# Patient Record
Sex: MALE | Race: WHITE | NOT HISPANIC OR LATINO | Employment: FULL TIME | URBAN - METROPOLITAN AREA
[De-identification: names, ages, dates, MRNs, and addresses within clinical notes are randomized per-mention and may not be internally consistent; named-entity substitution may affect disease eponyms.]

---

## 2017-06-06 ENCOUNTER — OFFICE VISIT (OUTPATIENT)
Dept: FAMILY MEDICINE | Facility: CLINIC | Age: 42
End: 2017-06-06
Payer: COMMERCIAL

## 2017-06-06 VITALS
HEIGHT: 72 IN | TEMPERATURE: 98.7 F | BODY MASS INDEX: 30.48 KG/M2 | DIASTOLIC BLOOD PRESSURE: 62 MMHG | SYSTOLIC BLOOD PRESSURE: 128 MMHG | OXYGEN SATURATION: 96 % | WEIGHT: 225 LBS

## 2017-06-06 DIAGNOSIS — J02.9 ALLERGIC PHARYNGITIS: ICD-10-CM

## 2017-06-06 DIAGNOSIS — R07.0 THROAT PAIN: Primary | ICD-10-CM

## 2017-06-06 LAB
DEPRECATED S PYO AG THROAT QL EIA: NORMAL
MICRO REPORT STATUS: NORMAL
SPECIMEN SOURCE: NORMAL

## 2017-06-06 PROCEDURE — 87081 CULTURE SCREEN ONLY: CPT | Performed by: NURSE PRACTITIONER

## 2017-06-06 PROCEDURE — 99213 OFFICE O/P EST LOW 20 MIN: CPT | Performed by: NURSE PRACTITIONER

## 2017-06-06 PROCEDURE — 87880 STREP A ASSAY W/OPTIC: CPT | Performed by: NURSE PRACTITIONER

## 2017-06-06 ASSESSMENT — PAIN SCALES - GENERAL: PAINLEVEL: MILD PAIN (2)

## 2017-06-06 NOTE — PROGRESS NOTES
"  SUBJECTIVE:                                                    Eleazar Herrera is a 42 year old male who presents to clinic today for the following health issues:      Acute Illness   Acute illness concerns: Sore throat  Onset: 3 1/2 weeks    Fever: no    Chills/Sweats: no    Headache (location?): no    Sinus Pressure:no    Conjunctivitis:  no    Ear Pain: no    Rhinorrhea: no    Congestion: no    Sore Throat: YES     Cough: YES-non-productive    Wheeze: no    Decreased Appetite: no    Nausea: no    Vomiting: no    Diarrhea:  no    Dysuria/Freq.: no    Fatigue/Achiness: no    Sick/Strep Exposure: no     Therapies Tried and outcome: Chloroacetic and OTC cold medications    Had cold symptoms which have since resolved but the sore throat has not  Sore throat is better \"because I don't have post nasal drip\" but still present and rates at 1-2/10  Not taking anything for it  Tolerating oral intake  Worse at the end of the day and in the morning  Cough has resolved  Denies acid reflux, dyspepsia, indigestion  Not painful \"just annoying\"  Declines STD testing  Nonsmoker    Problem list and histories reviewed & adjusted, as indicated.  Additional history: none    Patient Active Problem List   Diagnosis     Lumbago     Family history of Parkinson's disease     Hyperlipidemia LDL goal <130     Tremor     Lumbosacral disc herniation     Anxiety     Past Surgical History:   Procedure Laterality Date     DISCECTOMY LUMBAR POSTERIOR MICROSCOPIC ONE LEVEL  5/23/2013    Procedure: DISCECTOMY LUMBAR POSTERIOR MICROSCOPIC ONE LEVEL;  Left Lumbar 5-Sacral 1 Microdiscectomy ;  Surgeon: James Curran MD;  Location: [facility]     epidural injection      back problem     SINUS SURGERY       TONSILLECTOMY         Social History   Substance Use Topics     Smoking status: Never Smoker     Smokeless tobacco: Never Used     Alcohol use Yes      Comment: OCCASSIONALLY     Family History   Problem Relation Age of Onset     " Parkinsonism Mother      Parkinsonism Maternal Grandfather      DIABETES Sister      type i     C.A.D. No family hx of      CANCER No family hx of          Current Outpatient Prescriptions   Medication Sig Dispense Refill     propranolol (INDERAL) 20 MG tablet Take 1 tablet (20 mg) by mouth daily as needed (Patient not taking: Reported on 6/6/2017) 90 tablet 3       Reviewed and updated as needed this visit by clinical staff  Tobacco  Allergies  Meds  Med Hx  Surg Hx  Fam Hx  Soc Hx      Reviewed and updated as needed this visit by Provider         ROS:  Constitutional, HEENT, cardiovascular, pulmonary, gi and gu systems are negative, except as otherwise noted.    OBJECTIVE:                                                    /62  Temp 98.7  F (37.1  C) (Oral)  Ht 6' (1.829 m)  Wt 225 lb (102.1 kg)  SpO2 96%  BMI 30.52 kg/m2  Body mass index is 30.52 kg/(m^2).  GENERAL: healthy, alert and no distress  EYES: Eyes grossly normal to inspection, PERRL and conjunctivae and sclerae normal  HENT: ear canals and TM's normal, oropharynx erythematous but without exudate or lesions. Tonsils surgically absent, nasal mucosa normal without edema, slight clear drainage present  NECK: no adenopathy, no asymmetry, masses, or scars and thyroid normal to palpation  RESP: lungs clear to auscultation - no rales, rhonchi or wheezes  CV: regular rate and rhythm, normal S1 S2, no S3 or S4, no murmur, click or rub, no peripheral edema and peripheral pulses strong    Diagnostic Test Results:  none      ASSESSMENT/PLAN:                                                        ICD-10-CM    1. Throat pain R07.0 Rapid strep screen     Beta strep group A culture   2. Allergic pharyngitis J02.9        No signs of bacterial infection.   Suspect allergy component. Recommend daily antihistamine and BID warm salt water gargle  Also considered GERD vs. STD, though no lesions seen and patient denies concern for STD  Recommend to follow up  in 2 weeks if no improvement with the above measures    МАРИНА Montana CNP  [facility]

## 2017-06-06 NOTE — MR AVS SNAPSHOT
After Visit Summary   6/6/2017    Eleazar Herrera    MRN: [id number]           Patient Information     Date Of Birth          1975        Visit Information        Provider Department      6/6/2017 11:40 AM Amaya Biswas APRN CNP [facility]        Today's Diagnoses     Throat pain    -  1      Care Instructions    Try a warm salt water gargle in the morning and at bedtime  May also try hydrogen peroxide 1:1 with water    Take an daily antihistamine. Look for cetrizine (Zyrtec) or loratadine (Claritin)    If not better in 3 weeks, call the clinic          Follow-ups after your visit        Your next 10 appointments already scheduled     Aug 08, 2017  7:40 AM CDT   (Arrive by 7:25 AM)   Return Movement Disorder with Nikolai rAce MD   [facility] ([facility])    [address] [place] [id number] [phone]              Who to contact     If you have questions or need follow up information about today's clinic visit or your schedule please contact [facility] directly at [phone].  Normal or non-critical lab and imaging results will be communicated to you by MyChart, letter or phone within 4 business days after the clinic has received the results. If you do not hear from us within 7 days, please contact the clinic through [employer] [facility] or phone. If you have a critical or abnormal lab result, we will notify you by phone as soon as possible.  Submit refill requests through [employer] or call your pharmacy and they will forward the refill request to us. Please allow 3 business days for your refill to be completed.          Additional Information About Your Visit        MyChart Information     [employer] gives you secure access to your electronic health record. If you see a primary care provider, you can also send messages to your care team and make appointments. If you have questions, please  call your primary care clinic.  If you do not have a primary care provider, please call [phone] and they will assist you.        Care EveryWhere ID     This is your Care EveryWhere ID. This could be used by other organizations to access your [facility] medical records  [id number]        Your Vitals Were     Temperature Height Pulse Oximetry BMI (Body Mass Index)          98.7  F (37.1  C) (Oral) 6' (1.829 m) 96% 30.52 kg/m2         Blood Pressure from Last 3 Encounters:   06/06/17 146/79   08/03/16 122/78   04/14/16 130/78    Weight from Last 3 Encounters:   06/06/17 225 lb (102.1 kg)   08/03/16 225 lb (102.1 kg)   04/14/16 221 lb 8 oz (100.5 kg)              We Performed the Following     Beta strep group A culture     Rapid strep screen        Primary Care Provider Office Phone # Fax #    Scot Raygoza [phone]       [place] [address] [facility] [address]        Thank you!     Thank you for choosing [facility]  for your care. Our goal is always to provide you with excellent care. Hearing back from our patients is one way we can continue to improve our services. Please take a few minutes to complete the written survey that you may receive in the mail after your visit with us. Thank you!             Your Updated Medication List - Protect others around you: Learn how to safely use, store and throw away your medicines at www.disposemymeds.org.          This list is accurate as of: 6/6/17 12:35 PM.  Always use your most recent med list.                   Brand Name Dispense Instructions for use    propranolol 20 MG tablet    INDERAL    90 tablet    Take 1 tablet (20 mg) by mouth daily as needed

## 2017-06-06 NOTE — NURSING NOTE
Chief Complaint   Patient presents with     Pharyngitis       Initial /79 (BP Location: Right arm, Patient Position: Chair, Cuff Size: Adult Regular)  Temp 98.7  F (37.1  C) (Oral)  Ht 6' (1.829 m)  Wt 225 lb (102.1 kg)  SpO2 96%  BMI 30.52 kg/m2 Estimated body mass index is 30.52 kg/(m^2) as calculated from the following:    Height as of this encounter: 6' (1.829 m).    Weight as of this encounter: 225 lb (102.1 kg).  Medication Reconciliation: complete.  KEREN Griffith MA

## 2017-06-06 NOTE — PATIENT INSTRUCTIONS
Try a warm salt water gargle in the morning and at bedtime  May also try hydrogen peroxide 1:1 with water    Take an daily antihistamine. Look for cetrizine (Zyrtec) or loratadine (Claritin)    If not better in 3 weeks, call the clinic

## 2017-06-07 LAB
BACTERIA SPEC CULT: NORMAL
MICRO REPORT STATUS: NORMAL
SPECIMEN SOURCE: NORMAL

## 2018-04-09 ENCOUNTER — TRANSFERRED RECORDS (OUTPATIENT)
Dept: HEALTH INFORMATION MANAGEMENT | Facility: CLINIC | Age: 43
End: 2018-04-09
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
## 2018-09-21 ENCOUNTER — OFFICE VISIT (OUTPATIENT)
Dept: NEUROLOGY | Facility: CLINIC | Age: 43
End: 2018-09-21
Payer: COMMERCIAL

## 2018-09-21 VITALS
DIASTOLIC BLOOD PRESSURE: 73 MMHG | RESPIRATION RATE: 12 BRPM | HEART RATE: 62 BPM | WEIGHT: 227.8 LBS | OXYGEN SATURATION: 97 % | SYSTOLIC BLOOD PRESSURE: 122 MMHG | BODY MASS INDEX: 30.9 KG/M2

## 2018-09-21 DIAGNOSIS — H91.90 CHANGE IN HEARING, UNSPECIFIED LATERALITY: ICD-10-CM

## 2018-09-21 DIAGNOSIS — G47.9 SLEEP DISORDER: ICD-10-CM

## 2018-09-21 DIAGNOSIS — R25.1 TREMOR: Primary | ICD-10-CM

## 2018-09-21 DIAGNOSIS — R06.83 SNORES: ICD-10-CM

## 2018-09-21 DIAGNOSIS — Z00.00 ROUTINE GENERAL MEDICAL EXAMINATION AT A HEALTH CARE FACILITY: ICD-10-CM

## 2018-09-21 RX ORDER — PROPRANOLOL HYDROCHLORIDE 20 MG/1
20 TABLET ORAL DAILY PRN
Qty: 90 TABLET | Refills: 3 | Status: SHIPPED | OUTPATIENT
Start: 2018-09-21 | End: 2018-09-21

## 2018-09-21 RX ORDER — PROPRANOLOL HYDROCHLORIDE 20 MG/1
20 TABLET ORAL DAILY PRN
Qty: 90 TABLET | Refills: 3 | Status: SHIPPED | OUTPATIENT
Start: 2018-09-21 | End: 2020-12-11
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
## 2018-09-21 NOTE — Clinical Note
9/21/2018       RE: Eleazar Herrera  [address] [address]     Dear Colleague,    Thank you for referring your patient, Eleazar Herrera, to the [facility] NEUROLOGY at [facility]. Please see a copy of my visit note below.    No notes on file    Again, thank you for allowing me to participate in the care of your patient.      Sincerely,    Nikolai Arce MD

## 2018-10-04 ENCOUNTER — PRE VISIT (OUTPATIENT)
Dept: SLEEP MEDICINE | Facility: CLINIC | Age: 43
End: 2018-10-04
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
## 2018-10-05 ENCOUNTER — OFFICE VISIT (OUTPATIENT)
Dept: SLEEP MEDICINE | Facility: CLINIC | Age: 43
End: 2018-10-05
Payer: COMMERCIAL

## 2018-10-05 VITALS
HEART RATE: 76 BPM | DIASTOLIC BLOOD PRESSURE: 76 MMHG | HEIGHT: 72 IN | BODY MASS INDEX: 30.61 KG/M2 | SYSTOLIC BLOOD PRESSURE: 125 MMHG | WEIGHT: 226 LBS | OXYGEN SATURATION: 97 % | RESPIRATION RATE: 16 BRPM

## 2018-10-05 DIAGNOSIS — G47.9 SLEEP DISORDER: Primary | ICD-10-CM

## 2018-10-05 DIAGNOSIS — G47.50 PARASOMNIA: ICD-10-CM

## 2018-10-05 DIAGNOSIS — R06.83 SNORES: ICD-10-CM

## 2018-10-05 DIAGNOSIS — G47.30 OBSERVED SLEEP APNEA: ICD-10-CM

## 2018-10-05 DIAGNOSIS — Z23 NEED FOR PROPHYLACTIC VACCINATION AND INOCULATION AGAINST INFLUENZA: ICD-10-CM

## 2018-10-05 PROCEDURE — 90686 IIV4 VACC NO PRSV 0.5 ML IM: CPT | Performed by: INTERNAL MEDICINE

## 2018-10-05 PROCEDURE — 90471 IMMUNIZATION ADMIN: CPT | Performed by: INTERNAL MEDICINE

## 2018-10-05 PROCEDURE — 99204 OFFICE O/P NEW MOD 45 MIN: CPT | Mod: 25 | Performed by: INTERNAL MEDICINE

## 2018-10-05 NOTE — PROGRESS NOTES

## 2018-10-05 NOTE — PROGRESS NOTES
Chief complaint: Consultation requested by Nikolai Arce MD for the evaluation of unusual behaviors at night as well as snoring    History of Present Illness: 43-year-old gentleman with history of tremor,  followed in the movement disorder clinic.  There is a history of parkinsonism in the family.  In the last couple of years he has had increasing frequency of snoring, vivid bad dreams, kicking and sometimes thrashing his arms.  He is also been vocal at night.  He is gained about 10 pounds in the last 10 years or so.  He wakes up with headaches a couple times a week.  He does grind his teeth and is being evaluated for this he has not had an appliance made at this point.  The snoring is loud and in all body positions and he has been told about possible apneas by his wife.  Does have some dry mouth in the morning and some difficulty breathing through his nose, left nasal passages narrow.  He has had sinus surgery in the past.  He denies excessive sleepiness but does report a slightly elevated Adams.  He can be sleepy on long road trips and has had to stop before.  Otherwise he does do a fair amount of driving. He has 3 cups of coffee and caffeinated soda a day.  No sleepiness behind the wheel otherwise.  He does drink alcohol,  About 3-4 alcoholic beverages a night, usually beer and wine.  He does not use medical or recreational marijuana.  No current tobacco use.    He has had occasional leg cramps but no restless leg symptoms.  Bedroom is conducive to sleep however he does have a toddler coming into the room most nights usually falls asleep quickly in the bed.    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        Social History     Social History     Marital status: Single     Spouse name: N/A     Number of children: 0     Years of education: N/A     Occupational History      [employer]     Social History Main Topics     Smoking status: Never Smoker     Smokeless tobacco: Never Used     Alcohol use Yes      Comment: OCCASSIONALLY     Drug use: No     Sexual activity: Yes     Partners: Female     Other Topics Concern     Parent/Sibling W/ Cabg, Mi Or Angioplasty Before 65f 55m? 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 He is living in [facility]    Single    Has a house mate; who lives upstairs    No dependents.    Broke up with girl friend - after 6 yrs.    She had 2 kids: 12 yrs and 9 yrs    No smoking    Social alcohol.            Mother lives in [place], [place] -- near [place]    She has parkinson and is hallucinating on mirapex    She is not driving and was born in 1940 and is 73 yrs old.            Family history of parkinson in his mother with onset of her symptoms in her late 60s and she is 73 yrs old now.        Maternal grandfather had parkinson as well and passed away in his 80s.        2015    He lives in [place] and he has a fiance who is pregnant at this time with their child.     She is a heads of commerce for [employer]               Family History   Problem Relation Age of Onset     Parkinsonism Mother      Restless Leg Syndrome Father      Parkinsonism Maternal Grandfather      Diabetes Sister      type i     C.A.D. No family hx of      Cancer No family hx of        Review of Systems: Positve for occasional mild orthopnea when he first lies down, some joint pains, some depression and anxiety symptoms, and per HPI, otherwise  comprehensive review of systems is negative.  No constipation, anosmia, difficulty getting out of a chair, or changes in handwriting.    EXAM: Pleasant and alert  /76  Pulse 76  Resp 16  Ht 1.829 m (6')  Wt 102.5 kg (226 lb)  SpO2 97%  BMI 30.65 kg/m2  HEENT: Normocephalic/atraumatic, pupils are equal, reactive to light, no scleral icterus  Oropharynx: Mallampati 2, tonsillar stage 0I  Neck supple no lymphadenopathy, neck circumference 17.5 inches  Chest: Clear to auscultation bilaterally, no rales or wheezes  Cardiac: Regular rate and rhythm, S1-S2 normal  Abdomen: Obese, soft and nontender, bowel sounds present  Extremities: Warm, well perfused, no cyanosis clubbing or edema  Psychiatric: Mood and affect appear normal  Neurologic: No gross focal deficits    ASSESSMENT: 43-year-old gentleman with nightly snoring, witnessed apneas, daytime fatigue likely under estimated by patient, complicated by possible dream enactment with swinging of his arms and vocalization.  Unclear if this is happening in the first half or second half at night.  Patient suspects first half.  Differential diagnosis includes REM sleep behavior disorder as well as pseudo-REM sleep behavior disorder (associated with obstructive sleep apnea or other disorder of arousal.)    PLAN: Recommended in lab polysomnography is as it is crucial to evaluate REM sleep.  Home sleep apnea testing does not evaluate sleep stages.  The study should be done in a split-night protocol.  If he should have severe sleep apnea recommend CPAP titration during the lab study in order to optimize visualization of REM sleep.  If he does have significant sleep apnea patient however would rather proceed with oral appliance therapy.  I think this would be reasonable he should complete this evaluation prior to having a device made for bruxism.  Please see patient instructions for further details of other counseling.  I strongly encouraged the patient to discontinue  use of alcohol at this point.  He should monitor symptoms off alcohol.  Patient is agreeable with this plan.  He declined the offer of a sleep aid for the sleep study.    Tamiko Guevara M.D.  Pulmonary/Critical Care/Sleep Medicine    The above note was dictated using voice recognition software and may include typographical errors. Please contact the author for any clarifications.

## 2018-10-05 NOTE — PATIENT INSTRUCTIONS
"MY TREATMENT INFORMATION FOR SLEEP APNEA-  Eleazar Herrera    DOCTOR : Tamiko Guevara MD      Am I having a sleep study at a sleep center?  Make sure you have an appointment for the study before you leave!    Am I having a home sleep study?  Watch this video:  [email or web address]?[email or web address]  Please verify your insurance coverage with your insurance carrier    Frequently asked questions:  1. What is Obstructive Sleep Apnea (EMMA)? EMMA is the most common type of sleep apnea. Apnea means, \"without breath.\"  Apnea is most often caused by narrowing or collapse of the upper airway as muscles relax during sleep.   Almost everyone has occasional apneas. Most people with sleep apnea have had brief interruptions at night frequently for many years.  The severity of sleep apnea is related to how frequent and severe the events are.   2. What are the consequences of EMMA? Symptoms include: feeling sleepy during the day, snoring loudly, gasping or stopping of breathing, trouble sleeping, and occasionally morning headaches or heartburn at night.  Sleepiness can be serious and even increase the risk of falling asleep while driving. Other health consequences may include development of high blood pressure and other cardiovascular disease in persons who are susceptible. Untreated EMMA  can contribute to heart disease, stroke and diabetes.   3. What are the treatment options? In most situations, sleep apnea is a lifelong disease that must be managed with daily therapy. Medications are not effective for sleep apnea and surgery is generally not considered until other therapies have been tried. Your treatment is your choice . Continuous Positive Airway (CPAP) works right away and is the therapy that is effective in nearly everyone. An oral device to hold your jaw forward is usually the next most reliable option. Other options include postioning devices (to keep you off your back), " weight loss, and surgery including a tongue pacing device. There is more detail about some of these options below.    Important tips for using CPAP and similar devices   Know your equipment:  CPAP is continuous positive airway pressure that prevents obstructive sleep apnea by keeping the throat from collapsing while you are sleeping. In most cases, the device is  smart  and can slowly self-adjusts if your throat collapses and keeps a record every day of how well you are treated-this information is available to you and your care team.  BPAP is bilevel positive airway pressure that keeps your throat open and also assists each breath with a pressure boost to maintain adequate breathing.  Special kinds of BPAP are used in patients who have inadequate breathing from lung or heart disease. In most cases, the device is  smart  and can slowly self-adjusts to assist breathing. Like CPAP, the device keeps a record of how well you are treated.  Your mask is your connection to the device. You get to choose what feels most comfortable and the staff will help to make sure if fits. Here: are some examples of the different masks that are available:       Key points to remember on your journey with sleep apnea:  1. Sleep study.  PAP devices often need to be adjusted during a sleep study to show that they are effective and adjusted right.  2. Good tips to remember: Try wearing just the mask during a quiet time during the day so your body adapts to wearing it. A humidifier is recommended for comfort in most cases to prevent drying of your nose and throat. Allergy medication from your provider may help you if you are having nasal congestion.  3. Getting settled-in. It takes more than one night for most of us to get used to wearing a mask. Try wearing just the mask during a quiet time during the day so your body adapts to wearing it. A humidifier is recommended for comfort in most cases. Our team will work with you carefully on the  first day and will be in contact within 4 days and again at 2 and 4 weeks for advice and remote device adjustments. Your therapy is evaluated by the device each day.   4. Use it every night. The more you are able to sleep naturally for 7-8 hours, the more likely you will have good sleep and to prevent health risks or symptoms from sleep apnea. Even if you use it 4 hours it helps. Occasionally all of us are unable to use a medical therapy, in sleep apnea, it is not dangerous to miss one night.   5. Communicate. Call our skilled team on the number provided on the first day if your visit for problems that make it difficult to wear the device. Over 2 out of 3 patients can learn to wear the device long-term with help from our team. Remember to call our team or your sleep providers if you are unable to wear the device as we may have other solutions for those who cannot adapt to mask CPAP therapy. It is recommended that you sleep your sleep provider within the first 3 months and yearly after that if you are not having problems.   Take care of your equipment. Make sure you clean your mask and tubing using directions every day and that your filter and mask are replaced as recommended or if they are not working.     BESIDES CPAP, WHAT OTHER THERAPIES ARE THERE?    Positioning Device  Positioning devices are generally used when sleep apnea is mild and only occurs on your back.This example shows a pillow that straps around the waist. It may be appropriate for those whose sleep study shows milder sleep apnea that occurs primarily when lying flat on one's back. Preliminary studies have shown benefit but effectiveness at home may need to be verified by a home sleep test. These devices are generally not covered by medical insurance.  Examples of devices that maintain sleeping on the back to prevent snoring and mild sleep apnea.    Belt type body positioner  Http://[employer]/    Electronic  reminder  Http://nightshifttherapy.com/  Http://www.[employer]ah.com.au/    Oral Appliance  What is oral appliance therapy?  An oral appliance device fits on your teeth at night like a retainer used after having braces. The device is made by a specialized dentist and requires several visits over 1-2 months before a manufactured device is made to fit your teeth and is adjusted to prevent your sleep apnea. Once an oral device is working properly, snoring should be improved. A home sleep test may be recommended at that time if to determine whether the sleep apnea is adequately treated.       Some things to remember:  -Oral devices are often, but not always, covered by your medical insurance. Be sure to check with your insurance provider.   -If you are referred for oral therapy, you will be given a list of specialized dentists to consider or you may choose to visit the Web site of the American Academy of Dental Sleep Medicine  -Oral devices are less likely to work if you have severe sleep apnea or are extremely overweight.     More detailed information  An oral appliance is a small acrylic device that fits over the upper and lower teeth  (similar to a retainer or a mouth guard). This device slightly moves jaw forward, which moves the base of the tongue forward, opens the airway, improves breathing for effective treat snoring and obstructive sleep apnea in perhaps 7 out of 10 people .  The best working devices are custom-made by a dental device  after a mold is made of the teeth 1, 2, 3.  When is an oral appliance indicated?  Oral appliance therapy is recommended as a first-line treatment for patients with primary snoring, mild sleep apnea, and for patients with moderate sleep apnea who prefer appliance therapy to use of CPAP4, 5. Severity of sleep apnea is determined by sleep testing and is based on the number of respiratory events per hour of sleep.   How successful is oral appliance therapy?  The success rate  of oral appliance therapy in patients with mild sleep apnea is 75-80% while in patients with moderate sleep apnea it is 50-70%. The chance of success in patients with severe sleep apnea is 40-50%. The research also shows that oral appliances have a beneficial effect on the cardiovascular health of EMMA patients at the same magnitude as CPAP therapy7.  Oral appliances should be a second-line treatment in cases of severe sleep apnea, but if not completely successful then a combination therapy utilizing CPAP plus oral appliance therapy may be effective. Oral appliances tend to be effective in a broad range of patients although studies show that the patients who have the highest success are females, younger patients, those with milder disease, and less severe obesity. 3, 6.   Finding a dentist that practices dental sleep medicine  Specific training is available through the American Academy of Dental Sleep Medicine for dentists interested in working in the field of sleep. To find a dentist who is educated in the field of sleep and the use of oral appliances, near you, visit the Web site of the American Academy of Dental Sleep Medicine.    References  1. Juani et al. Objectively measured vs self-reported compliance during oral appliance therapy for sleep-disordered breathing. Chest 2013; 144(5): 5196-7268.  2. Milagros et al. Objective measurement of compliance during oral appliance therapy for sleep-disordered breathing. Thorax 2013; 68(1): 91-96.  3. Albert et al. Mandibular advancement devices in 620 men and women with EMMA and snoring: tolerability and predictors of treatment success. Chest 2004; 125: 6651-3328.  4. Cory, et al. Oral appliances for snoring and EMMA: a review. Sleep 2006; 29: 244-262.  5. Ahsan et al. Oral appliance treatment for EMMA: an update. J Clin Sleep Med 2014; 10(2): 215-227.  6. Emeka et al. Predictors of OSAH treatment outcome. J Dent Res 2007; 86:  2014-7057.    Surgery:    Surgery for obstructive sleep apnea is considered generally only when other therapies fail to work. Surgery may be discussed with you if you are having a difficult time tolerating CPAP and or when there is an abnormal structure that requires surgical correction.  Nose and throat surgeries often enlarge the airway to prevent collapse.  Most of these surgeries create pain for 1-2 weeks and up to half of the most common surgeries are not effective throughout life.  You should carefully discuss the benefits and drawbacks to surgery with your sleep provider and surgeon to determine if it is the best solution for you.   More information  Surgery for EMMA is directed at areas that are responsible for narrowing or complete obstruction of the airway during sleep.  There are a wide range of procedures available to enlarge and/or stabilize the airway to prevent blockage of breathing in the three major areas where it can occur: the palate, tongue, and nasal regions.  Successful surgical treatment depends on the accurate identification of the factors responsible for obstructive sleep apnea in each person.  A personalized approach is required because there is no single treatment that works well for everyone.  Because of anatomic variation, consultation with an examination by a sleep surgeon is a critical first step in determining what surgical options are best for each patient.  In some cases, examination during sedation may be recommended in order to guide the selection of procedures.  Patients will be counseled about risks and benefits as well as the typical recovery course after surgery. Surgery is typically not a cure for a person s EMMA.  However, surgery will often significantly improve one s EMMA severity (termed  success rate ).  Even in the absence of a cure, surgery will decrease the cardiovascular risk associated with OSA7; improve overall quality of life8 (sleepiness, functionality, sleep  quality, etc).      Palate Procedures:  Patients with EMMA often have narrowing of their airway in the region of their tonsils and uvula.  The goals of palate procedures are to widen the airway in this region as well as to help the tissues resist collapse.  Modern palate procedure techniques focus on tissue conservation and soft tissue rearrangement, rather than tissue removal.  Often the uvula is preserved in this procedure. Residual sleep apnea is common in patient after pharyngoplasty with an average reduction in sleep apnea events of 33%2.      Tongue Procedures:  ExamWhile patients are awake, the muscles that surround the throat are active and keep this region open for breathing. These muscles relax during sleep, allowing the tongue and other structures to collapse and block breathing.  There are several different tongue procedures available.  Selection of a tongue base procedure depends on characteristics seen on physical exam.  Generally, procedures are aimed at removing bulky tissues in this area or preventing the back of the tongue from falling back during sleep.  Success rates for tongue surgery range from 50-62%3.    Hypoglossal Nerve Stimulation:  Hypoglossal nerve stimulation has recently received approval from the [place] Food and Drug Administration for the treatment of obstructive sleep apnea.  This is based on research showing that the system was safe and effective in treating sleep apnea6.  Results showed that the median AHI score decreased 68%, from 29.3 to 9.0. This therapy uses an implant system that senses breathing patterns and delivers mild stimulation to airway muscles, which keeps the airway open during sleep.  The system consists of three fully implanted components: a small generator (similar in size to a pacemaker), a breathing sensor, and a stimulation lead.  Using a small handheld remote, a patient turns the therapy on before bed and off upon awakening.    Candidates for this  device must be greater than 22 years of age, have moderate to severe EMMA (AHI between 20-65), BMI less than 32, have tried CPAP/oral appliance without success, and have appropriate upper airway anatomy (determined by a sleep endoscopy performed by Dr. Wakefield).    Hypoglossal Nerve Stimulation Pathway:    The sleep surgeon s office will work with the patient through the insurance prior-authorization process (including communications and appeals).    Nasal Procedures:  Nasal obstruction can interfere with nasal breathing during the day and night.  Studies have shown that relief of nasal obstruction can improve the ability of some patients to tolerate positive airway pressure therapy for obstructive sleep apnea1.  Treatment options include medications such as nasal saline, topical corticosteroid and antihistamine sprays, and oral medications such as antihistamines or decongestants. Non-surgical treatments can include external nasal dilators for selected patients. If these are not successful by themselves, surgery can improve the nasal airway either alone or in combination with these other options.      Combination Procedures:  Combination of surgical procedures and other treatments may be recommended, particularly if patients have more than one area of narrowing or persistent positional disease.  The success rate of combination surgery ranges from 66-80%2,3.    References  1. Talia ZHOU. The Role of the Nose in Snoring and Obstructive Sleep Apnoea: An Update.  Eur Arch Otorhinolaryngol. 2011; 268: 1365-73.  2.  Chasidy SM; Nelson JA; Trisha JR; Pallanch JF; Lety MB; Chris SG; Марина VILLATOROD. Surgical modifications of the upper airway for obstructive sleep apnea in adults: a systematic review and meta-analysis. SLEEP 2010;33(10):3719-9273. Rosalind CALVIN. Hypopharyngeal surgery in obstructive sleep apnea: an evidence-based medicine review.  Arch Otolaryngol Head Neck Surg. 2006 Feb;132(2):206-13.  3. Sherman DOHERTY, Unruly Keen  LOUANN. The efficacy of anatomically based multilevel surgery for obstructive sleep apnea. Otolaryngol Head Neck Surg. 2003 Oct;129(4):327-35.  4. Rosalind CALVIN, Goldberg A. Hypopharyngeal Surgery in Obstructive Sleep Apnea: An Evidence-Based Medicine Review. Arch Otolaryngol Head Neck Surg. 2006 Feb;132(2):206-13.  5. Paul PJ et al. Upper-Airway Stimulation for Obstructive Sleep Apnea.  N Engl J Med. 2014 Jan 9;370(2):139-49.  6. Houston Y et al. Increased Incidence of Cardiovascular Disease in Middle-aged Men with Obstructive Sleep Apnea. Am J Respir Crit Care Med; 2002 166: 159-165  7. Quinton EM et al. Studying Life Effects and Effectiveness of Palatopharyngoplasty (SLEEP) study: Subjective Outcomes of Isolated Uvulopalatopharyngoplasty. Otolaryngol Head Neck Surg. 2011; 144: 623-631.            Your BMI is Body mass index is 30.65 kg/(m^2).  Weight management is a personal decision.  If you are interested in exploring weight loss strategies, the following discussion covers the approaches that may be successful. Body mass index (BMI) is one way to tell whether you are at a healthy weight, overweight, or obese. It measures your weight in relation to your height.  A BMI of 18.5 to 24.9 is in the healthy range. A person with a BMI of 25 to 29.9 is considered overweight, and someone with a BMI of 30 or greater is considered obese. More than two-thirds of American adults are considered overweight or obese.  Being overweight or obese increases the risk for further weight gain. Excess weight may lead to heart disease and diabetes.  Creating and following plans for healthy eating and physical activity may help you improve your health.  Weight control is part of healthy lifestyle and includes exercise, emotional health, and healthy eating habits. Careful eating habits lifelong are the mainstay of weight control. Though there are significant health benefits from weight loss, long-term weight loss with diet alone may be very  difficult to achieve- studies show long-term success with dietary management in less than 10% of people. Attaining a healthy weight may be especially difficult to achieve in those with severe obesity. In some cases, medications, devices and surgical management might be considered.  What can you do?  If you are overweight or obese and are interested in methods for weight loss, you should discuss this with your provider.     Consider reducing daily calorie intake by 500 calories.     Keep a food journal.     Avoiding skipping meals, consider cutting portions instead.    Diet combined with exercise helps maintain muscle while optimizing fat loss. Strength training is particularly important for building and maintaining muscle mass. Exercise helps reduce stress, increase energy, and improves fitness. Increasing exercise without diet control, however, may not burn enough calories to loose weight.       Start walking three days a week 10-20 minutes at a time    Work towards walking thirty minutes five days a week     Eventually, increase the speed of your walking for 1-2 minutes at time    In addition, we recommend that you review healthy lifestyles and methods for weight loss available through the National Institutes of Health patient information sites:  http://win.niddk.nih.gov/publications/index.htm    And look into health and wellness programs that may be available through your health insurance provider, employer, local community center, or wes club.    Weight management plan: Patient was referred to their PCP to discuss a diet and exercise plan.

## 2018-10-05 NOTE — NURSING NOTE
Chief Complaint   Patient presents with     Consult       Initial /76  Pulse 76  Resp 16  Ht 1.829 m (6')  Wt 102.5 kg (226 lb)  SpO2 97%  BMI 30.65 kg/m2 Estimated body mass index is 30.65 kg/(m^2) as calculated from the following:    Height as of this encounter: 1.829 m (6').    Weight as of this encounter: 102.5 kg (226 lb).    Medication Reconciliation: complete    Neck circumference: 17.5 inches / 44 centimeters.    DME:     [facility] ~[place]

## 2018-10-05 NOTE — MR AVS SNAPSHOT
"              After Visit Summary   10/5/2018    Eleazar Herrera    MRN: [id number]           Patient Information     Date Of Birth          1975        Visit Information        Provider Department      10/5/2018 1:00 PM Tamiko Guevara MD [facility]        Today's Diagnoses     Need for prophylactic vaccination and inoculation against influenza    -  1    Sleep disorder        Snores        Parasomnia        Observed sleep apnea          Care Instructions    MY TREATMENT INFORMATION FOR SLEEP APNEA-  Eleazar Herrera    DOCTOR : Tamiko Guevara MD      Am I having a sleep study at a sleep center?  Make sure you have an appointment for the study before you leave!    Am I having a home sleep study?  Watch this video:  https://www.Caktus.com/watch?v=CteI_[email or web address]&[email or web address]  Please verify your insurance coverage with your insurance carrier    Frequently asked questions:  1. What is Obstructive Sleep Apnea (EMMA)? EMMA is the most common type of sleep apnea. Apnea means, \"without breath.\"  Apnea is most often caused by narrowing or collapse of the upper airway as muscles relax during sleep.   Almost everyone has occasional apneas. Most people with sleep apnea have had brief interruptions at night frequently for many years.  The severity of sleep apnea is related to how frequent and severe the events are.   2. What are the consequences of EMMA? Symptoms include: feeling sleepy during the day, snoring loudly, gasping or stopping of breathing, trouble sleeping, and occasionally morning headaches or heartburn at night.  Sleepiness can be serious and even increase the risk of falling asleep while driving. Other health consequences may include development of high blood pressure and other cardiovascular disease in persons who are susceptible. Untreated EMMA  can contribute to heart disease, stroke and diabetes.   3. What are the treatment options? In most " situations, sleep apnea is a lifelong disease that must be managed with daily therapy. Medications are not effective for sleep apnea and surgery is generally not considered until other therapies have been tried. Your treatment is your choice . Continuous Positive Airway (CPAP) works right away and is the therapy that is effective in nearly everyone. An oral device to hold your jaw forward is usually the next most reliable option. Other options include postioning devices (to keep you off your back), weight loss, and surgery including a tongue pacing device. There is more detail about some of these options below.    Important tips for using CPAP and similar devices   Know your equipment:  CPAP is continuous positive airway pressure that prevents obstructive sleep apnea by keeping the throat from collapsing while you are sleeping. In most cases, the device is  smart  and can slowly self-adjusts if your throat collapses and keeps a record every day of how well you are treated-this information is available to you and your care team.  BPAP is bilevel positive airway pressure that keeps your throat open and also assists each breath with a pressure boost to maintain adequate breathing.  Special kinds of BPAP are used in patients who have inadequate breathing from lung or heart disease. In most cases, the device is  smart  and can slowly self-adjusts to assist breathing. Like CPAP, the device keeps a record of how well you are treated.  Your mask is your connection to the device. You get to choose what feels most comfortable and the staff will help to make sure if fits. Here: are some examples of the different masks that are available:       Key points to remember on your journey with sleep apnea:  1. Sleep study.  PAP devices often need to be adjusted during a sleep study to show that they are effective and adjusted right.  2. Good tips to remember: Try wearing just the mask during a quiet time during the day so your body  adapts to wearing it. A humidifier is recommended for comfort in most cases to prevent drying of your nose and throat. Allergy medication from your provider may help you if you are having nasal congestion.  3. Getting settled-in. It takes more than one night for most of us to get used to wearing a mask. Try wearing just the mask during a quiet time during the day so your body adapts to wearing it. A humidifier is recommended for comfort in most cases. Our team will work with you carefully on the first day and will be in contact within 4 days and again at 2 and 4 weeks for advice and remote device adjustments. Your therapy is evaluated by the device each day.   4. Use it every night. The more you are able to sleep naturally for 7-8 hours, the more likely you will have good sleep and to prevent health risks or symptoms from sleep apnea. Even if you use it 4 hours it helps. Occasionally all of us are unable to use a medical therapy, in sleep apnea, it is not dangerous to miss one night.   5. Communicate. Call our skilled team on the number provided on the first day if your visit for problems that make it difficult to wear the device. Over 2 out of 3 patients can learn to wear the device long-term with help from our team. Remember to call our team or your sleep providers if you are unable to wear the device as we may have other solutions for those who cannot adapt to mask CPAP therapy. It is recommended that you sleep your sleep provider within the first 3 months and yearly after that if you are not having problems.   Take care of your equipment. Make sure you clean your mask and tubing using directions every day and that your filter and mask are replaced as recommended or if they are not working.     BESIDES CPAP, WHAT OTHER THERAPIES ARE THERE?    Positioning Device  Positioning devices are generally used when sleep apnea is mild and only occurs on your back.This example shows a pillow that straps around the waist. It  may be appropriate for those whose sleep study shows milder sleep apnea that occurs primarily when lying flat on one's back. Preliminary studies have shown benefit but effectiveness at home may need to be verified by a home sleep test. These devices are generally not covered by medical insurance.  Examples of devices that maintain sleeping on the back to prevent snoring and mild sleep apnea.    Belt type body positioner  Http://[employer].com/    Electronic reminder  Http://nightshifttherapy.com/  Http://www.[employer].au/    Oral Appliance  What is oral appliance therapy?  An oral appliance device fits on your teeth at night like a retainer used after having braces. The device is made by a specialized dentist and requires several visits over 1-2 months before a manufactured device is made to fit your teeth and is adjusted to prevent your sleep apnea. Once an oral device is working properly, snoring should be improved. A home sleep test may be recommended at that time if to determine whether the sleep apnea is adequately treated.       Some things to remember:  -Oral devices are often, but not always, covered by your medical insurance. Be sure to check with your insurance provider.   -If you are referred for oral therapy, you will be given a list of specialized dentists to consider or you may choose to visit the Web site of the American Academy of Dental Sleep Medicine  -Oral devices are less likely to work if you have severe sleep apnea or are extremely overweight.     More detailed information  An oral appliance is a small acrylic device that fits over the upper and lower teeth  (similar to a retainer or a mouth guard). This device slightly moves jaw forward, which moves the base of the tongue forward, opens the airway, improves breathing for effective treat snoring and obstructive sleep apnea in perhaps 7 out of 10 people .  The best working devices are custom-made by a dental device  after a mold is  made of the teeth 1, 2, 3.  When is an oral appliance indicated?  Oral appliance therapy is recommended as a first-line treatment for patients with primary snoring, mild sleep apnea, and for patients with moderate sleep apnea who prefer appliance therapy to use of CPAP4, 5. Severity of sleep apnea is determined by sleep testing and is based on the number of respiratory events per hour of sleep.   How successful is oral appliance therapy?  The success rate of oral appliance therapy in patients with mild sleep apnea is 75-80% while in patients with moderate sleep apnea it is 50-70%. The chance of success in patients with severe sleep apnea is 40-50%. The research also shows that oral appliances have a beneficial effect on the cardiovascular health of EMMA patients at the same magnitude as CPAP therapy7.  Oral appliances should be a second-line treatment in cases of severe sleep apnea, but if not completely successful then a combination therapy utilizing CPAP plus oral appliance therapy may be effective. Oral appliances tend to be effective in a broad range of patients although studies show that the patients who have the highest success are females, younger patients, those with milder disease, and less severe obesity. 3, 6.   Finding a dentist that practices dental sleep medicine  Specific training is available through the American Academy of Dental Sleep Medicine for dentists interested in working in the field of sleep. To find a dentist who is educated in the field of sleep and the use of oral appliances, near you, visit the Web site of the American Academy of Dental Sleep Medicine.    References  1. Juani, et al. Objectively measured vs self-reported compliance during oral appliance therapy for sleep-disordered breathing. Chest 2013; 144(5): 5809-2053.  2. Milagros et al. Objective measurement of compliance during oral appliance therapy for sleep-disordered breathing. Thorax 2013; 68(1): 91-96.  3. Albert  et al. Mandibular advancement devices in 620 men and women with EMMA and snoring: tolerability and predictors of treatment success. Chest 2004; 125: 7369-9815.  4. Cory et al. Oral appliances for snoring and EMMA: a review. Sleep 2006; 29: 244-262.  5. Ahsan et al. Oral appliance treatment for EMMA: an update. J Clin Sleep Med 2014; 10(2): 215-227.  6. Emeka et al. Predictors of OSAH treatment outcome. J Dent Res 2007; 86: 4881-2815.    Surgery:    Surgery for obstructive sleep apnea is considered generally only when other therapies fail to work. Surgery may be discussed with you if you are having a difficult time tolerating CPAP and or when there is an abnormal structure that requires surgical correction.  Nose and throat surgeries often enlarge the airway to prevent collapse.  Most of these surgeries create pain for 1-2 weeks and up to half of the most common surgeries are not effective throughout life.  You should carefully discuss the benefits and drawbacks to surgery with your sleep provider and surgeon to determine if it is the best solution for you.   More information  Surgery for EMMA is directed at areas that are responsible for narrowing or complete obstruction of the airway during sleep.  There are a wide range of procedures available to enlarge and/or stabilize the airway to prevent blockage of breathing in the three major areas where it can occur: the palate, tongue, and nasal regions.  Successful surgical treatment depends on the accurate identification of the factors responsible for obstructive sleep apnea in each person.  A personalized approach is required because there is no single treatment that works well for everyone.  Because of anatomic variation, consultation with an examination by a sleep surgeon is a critical first step in determining what surgical options are best for each patient.  In some cases, examination during sedation may be recommended in order to guide the selection of  procedures.  Patients will be counseled about risks and benefits as well as the typical recovery course after surgery. Surgery is typically not a cure for a person s EMMA.  However, surgery will often significantly improve one s EMMA severity (termed  success rate ).  Even in the absence of a cure, surgery will decrease the cardiovascular risk associated with OSA7; improve overall quality of life8 (sleepiness, functionality, sleep quality, etc).      Palate Procedures:  Patients with EMMA often have narrowing of their airway in the region of their tonsils and uvula.  The goals of palate procedures are to widen the airway in this region as well as to help the tissues resist collapse.  Modern palate procedure techniques focus on tissue conservation and soft tissue rearrangement, rather than tissue removal.  Often the uvula is preserved in this procedure. Residual sleep apnea is common in patient after pharyngoplasty with an average reduction in sleep apnea events of 33%2.      Tongue Procedures:  ExamWhile patients are awake, the muscles that surround the throat are active and keep this region open for breathing. These muscles relax during sleep, allowing the tongue and other structures to collapse and block breathing.  There are several different tongue procedures available.  Selection of a tongue base procedure depends on characteristics seen on physical exam.  Generally, procedures are aimed at removing bulky tissues in this area or preventing the back of the tongue from falling back during sleep.  Success rates for tongue surgery range from 50-62%3.    Hypoglossal Nerve Stimulation:  Hypoglossal nerve stimulation has recently received approval from the [place] Food and Drug Administration for the treatment of obstructive sleep apnea.  This is based on research showing that the system was safe and effective in treating sleep apnea6.  Results showed that the median AHI score decreased 68%, from 29.3 to 9.0. This  therapy uses an implant system that senses breathing patterns and delivers mild stimulation to airway muscles, which keeps the airway open during sleep.  The system consists of three fully implanted components: a small generator (similar in size to a pacemaker), a breathing sensor, and a stimulation lead.  Using a small handheld remote, a patient turns the therapy on before bed and off upon awakening.    Candidates for this device must be greater than 22 years of age, have moderate to severe EMMA (AHI between 20-65), BMI less than 32, have tried CPAP/oral appliance without success, and have appropriate upper airway anatomy (determined by a sleep endoscopy performed by Dr. Wakefield).    Hypoglossal Nerve Stimulation Pathway:    The sleep surgeon s office will work with the patient through the insurance prior-authorization process (including communications and appeals).    Nasal Procedures:  Nasal obstruction can interfere with nasal breathing during the day and night.  Studies have shown that relief of nasal obstruction can improve the ability of some patients to tolerate positive airway pressure therapy for obstructive sleep apnea1.  Treatment options include medications such as nasal saline, topical corticosteroid and antihistamine sprays, and oral medications such as antihistamines or decongestants. Non-surgical treatments can include external nasal dilators for selected patients. If these are not successful by themselves, surgery can improve the nasal airway either alone or in combination with these other options.      Combination Procedures:  Combination of surgical procedures and other treatments may be recommended, particularly if patients have more than one area of narrowing or persistent positional disease.  The success rate of combination surgery ranges from 66-80%2,3.    References  1. Talia ZHOU. The Role of the Nose in Snoring and Obstructive Sleep Apnoea: An Update.  Eur Arch Otorhinolaryngol. 2011; 268:  1365-73.  2.  Chasidy SM; Nelson JA; Trisha JR; Pallanch JF; Lety MB; Chris SG; Марина ORTEGA. Surgical modifications of the upper airway for obstructive sleep apnea in adults: a systematic review and meta-analysis. SLEEP 2010;33(10):0626-9200. Rosalind CALVIN. Hypopharyngeal surgery in obstructive sleep apnea: an evidence-based medicine review.  Arch Otolaryngol Head Neck Surg. 2006 Feb;132(2):206-13.  3. Sherman VALDIVIAH1, Angelika Y, Unruly LOUANN. The efficacy of anatomically based multilevel surgery for obstructive sleep apnea. Otolaryngol Head Neck Surg. 2003 Oct;129(4):327-35.  4. Kezirian E, Goldberg A. Hypopharyngeal Surgery in Obstructive Sleep Apnea: An Evidence-Based Medicine Review. Arch Otolaryngol Head Neck Surg. 2006 Feb;132(2):206-13.  5. Paul MORROW et al. Upper-Airway Stimulation for Obstructive Sleep Apnea.  N Engl J Med. 2014 Jan 9;370(2):139-49.  6. Houston Y et al. Increased Incidence of Cardiovascular Disease in Middle-aged Men with Obstructive Sleep Apnea. Am J Respir Crit Care Med; 2002 166: 159-165  7. Alcantara EM et al. Studying Life Effects and Effectiveness of Palatopharyngoplasty (SLEEP) study: Subjective Outcomes of Isolated Uvulopalatopharyngoplasty. Otolaryngol Head Neck Surg. 2011; 144: 623-631.            Your BMI is Body mass index is 30.65 kg/(m^2).  Weight management is a personal decision.  If you are interested in exploring weight loss strategies, the following discussion covers the approaches that may be successful. Body mass index (BMI) is one way to tell whether you are at a healthy weight, overweight, or obese. It measures your weight in relation to your height.  A BMI of 18.5 to 24.9 is in the healthy range. A person with a BMI of 25 to 29.9 is considered overweight, and someone with a BMI of 30 or greater is considered obese. More than two-thirds of American adults are considered overweight or obese.  Being overweight or obese increases the risk for further weight gain. Excess weight may lead to  heart disease and diabetes.  Creating and following plans for healthy eating and physical activity may help you improve your health.  Weight control is part of healthy lifestyle and includes exercise, emotional health, and healthy eating habits. Careful eating habits lifelong are the mainstay of weight control. Though there are significant health benefits from weight loss, long-term weight loss with diet alone may be very difficult to achieve- studies show long-term success with dietary management in less than 10% of people. Attaining a healthy weight may be especially difficult to achieve in those with severe obesity. In some cases, medications, devices and surgical management might be considered.  What can you do?  If you are overweight or obese and are interested in methods for weight loss, you should discuss this with your provider.     Consider reducing daily calorie intake by 500 calories.     Keep a food journal.     Avoiding skipping meals, consider cutting portions instead.    Diet combined with exercise helps maintain muscle while optimizing fat loss. Strength training is particularly important for building and maintaining muscle mass. Exercise helps reduce stress, increase energy, and improves fitness. Increasing exercise without diet control, however, may not burn enough calories to loose weight.       Start walking three days a week 10-20 minutes at a time    Work towards walking thirty minutes five days a week     Eventually, increase the speed of your walking for 1-2 minutes at time    In addition, we recommend that you review healthy lifestyles and methods for weight loss available through the National Institutes of Health patient information sites:  http://win.niddk.nih.gov/publications/index.htm    And look into health and wellness programs that may be available through your health insurance provider, employer, local community center, or wes club.    Weight management plan: Patient was referred  to their PCP to discuss a diet and exercise plan.              Follow-ups after your visit        Your next 10 appointments already scheduled     Nov 08, 2018  8:00 PM CST   PSG Split with SLEEP STUDY RM 1   [facility] ([facility])    [address] [place] [id number] [phone]            Nov 28, 2018  2:20 PM CST   Return Sleep Patient with Tamiko Guevara MD   [facility])    [address] [place] [id number] [phone]            Sep 20, 2019  2:45 PM CDT   (Arrive by 2:30 PM)   Return Movement Disorder with Nikolai Arce MD   [facility] ([facility])    [address] [place] [address] [id number] [phone]              Future tests that were ordered for you today     Open Future Orders        Priority Expected Expires Ordered    Comprehensive Sleep Study Routine  4/3/2019 10/5/2018            Who to contact     If you have questions or need follow up information about today's clinic visit or your schedule please contact [facility] directly at [phone].  Normal or non-critical lab and imaging results will be communicated to you by MyChart, letter or phone within 4 business days after the clinic has received the results. If you do not hear from us within 7 days, please contact the clinic through [employer] or phone. If you have a critical or abnormal lab result, we will notify you by phone as soon as possible.  Submit refill requests through [employer] or call your pharmacy and they will forward the refill request to us. Please allow 3 business days for your refill to be completed.          Additional Information About Your Visit        [employer] Information     [employer] gives you secure access to your electronic health record. If you see a primary care provider,  you can also send messages to your care team and make appointments. If you have questions, please call your primary care clinic.  If you do not have a primary care provider, please call [phone] and they will assist you.        Care EveryWhere ID     This is your Care EveryWhere ID. This could be used by other organizations to access your [facility] medical records  [id number]        Your Vitals Were     Pulse Respirations Height Pulse Oximetry BMI (Body Mass Index)       76 16 1.829 m (6') 97% 30.65 kg/m2        Blood Pressure from Last 3 Encounters:   10/05/18 125/76   09/21/18 122/73   06/06/17 128/62    Weight from Last 3 Encounters:   10/05/18 102.5 kg (226 lb)   09/21/18 103.3 kg (227 lb 12.8 oz)   06/06/17 102.1 kg (225 lb)              We Performed the Following     FLU VACCINE, SPLIT VIRUS, IM (QUADRIVALENT) [23458]- >3 YRS     SLEEP EVALUATION & MANAGEMENT REFERRAL - ADULT -[facility] [place] [facility]  [phone] (Age 2 and up)     Vaccine Administration, Initial [[id number]]        Primary Care Provider Office Phone # Fax #    Scot Raygoza [phone]       [address] [id number]        Equal Access to Services     CECILLE POLK : Hadii jana skaggs hadasho Sogustavoali, waaxda luqadaha, qaybta kaalmada adeegyada, nila walden. So [facility] [phone].    ATENCIÓN: Si habla español, tiene a pham disposición servicios gratuitos de asistencia lingüística. Llame al [phone].    We comply with applicable federal civil rights laws and [place] laws. We do not discriminate on the basis of race, color, national origin, age, disability, sex, sexual orientation, or gender identity.            Thank you!     Thank you for choosing [facility]  for your care. Our goal is always to provide you with excellent care. Hearing back from our patients is one way we can continue to improve our services.  Please take a few minutes to complete the written survey that you may receive in the mail after your visit with us. Thank you!             Your Updated Medication List - Protect others around you: Learn how to safely use, store and throw away your medicines at www.disposemymeds.org.          This list is accurate as of 10/5/18  1:49 PM.  Always use your most recent med list.                   Brand Name Dispense Instructions for use Diagnosis    propranolol 20 MG tablet    INDERAL    90 tablet    Take 1 tablet (20 mg) by mouth daily as needed (tremor)    Tremor

## 2018-11-08 ENCOUNTER — THERAPY VISIT (OUTPATIENT)
Dept: SLEEP MEDICINE | Facility: CLINIC | Age: 43
End: 2018-11-08
Payer: COMMERCIAL

## 2018-11-08 DIAGNOSIS — R06.83 SNORES: ICD-10-CM

## 2018-11-08 DIAGNOSIS — G47.50 PARASOMNIA: ICD-10-CM

## 2018-11-08 DIAGNOSIS — G47.30 OBSERVED SLEEP APNEA: ICD-10-CM

## 2018-11-08 PROCEDURE — 95810 POLYSOM 6/> YRS 4/> PARAM: CPT | Performed by: INTERNAL MEDICINE

## 2018-11-08 NOTE — MR AVS SNAPSHOT
After Visit Summary   11/8/2018    Eleazar Herrera    MRN: [id number]           Patient Information     Date Of Birth          1975        Visit Information        Provider Department      11/8/2018 8:00 PM SLEEP STUDY RM 1 [facility]        Today's Diagnoses     Snores        Parasomnia        Observed sleep apnea           Follow-ups after your visit        Your next 10 appointments already scheduled     Nov 28, 2018  2:20 PM CST   Return Sleep Patient with Tamiko Guevara MD   [facility] ([facility])    [address] [place] [id number] [phone]            Sep 20, 2019  2:45 PM CDT   (Arrive by 2:30 PM)   Return Movement Disorder with Nikolai Arce MD   [facility] Neurology ([facility] [place])    [address] [place] [id number] [phone]              Who to contact     If you have questions or need follow up information about today's clinic visit or your schedule please contact [facility] directly at [phone].  Normal or non-critical lab and imaging results will be communicated to you by [employer], letter or phone within 4 business days after the clinic has received the results. If you do not hear from us within 7 days, please contact the clinic through [employer] or phone. If you have a critical or abnormal lab result, we will notify you by phone as soon as possible.  Submit refill requests through [employer] or call your pharmacy and they will forward the refill request to us. Please allow 3 business days for your refill to be completed.          Additional Information About Your Visit        MyChart Information     [employer] gives you secure access to your electronic health record. If you see a primary care provider, you can also send messages to your care team and make appointments. If you have questions,  please call your primary care clinic.  If you do not have a primary care provider, please call [phone] and they will assist you.        Care EveryWhere ID     This is your Care EveryWhere ID. This could be used by other organizations to access your [place] medical records  [id number]         Blood Pressure from Last 3 Encounters:   10/05/18 125/76   09/21/18 122/73   06/06/17 128/62    Weight from Last 3 Encounters:   10/05/18 102.5 kg (226 lb)   09/21/18 103.3 kg (227 lb 12.8 oz)   06/06/17 102.1 kg (225 lb)              We Performed the Following     Comprehensive Sleep Study        Primary Care Provider Office Phone # Fax #    Scot Raygoza [phone]       [address] [facility] [address]        Equal Access to Services     CECILLE POLK : Hadii jana skaggs hadasho Soomaali, waaxda luqadaha, qaybta kaalmada adeegyada, nila parekh haybrijesh alston . So [facility] [phone].    ATENCIÓN: Si habla español, tiene a pham disposición servicios gratuitos de asistencia lingüística. Llame al [phone].    We comply with applicable federal civil rights laws and [place] laws. We do not discriminate on the basis of race, color, national origin, age, disability, sex, sexual orientation, or gender identity.            Thank you!     Thank you for choosing [facility]  for your care. Our goal is always to provide you with excellent care. Hearing back from our patients is one way we can continue to improve our services. Please take a few minutes to complete the written survey that you may receive in the mail after your visit with us. Thank you!             Your Updated Medication List - Protect others around you: Learn how to safely use, store and throw away your medicines at www.disposemymeds.org.          This list is accurate as of 11/8/18 11:59 PM.  Always use your most recent med list.                   Brand Name Dispense Instructions for use Diagnosis     propranolol 20 MG tablet    INDERAL    90 tablet    Take 1 tablet (20 mg) by mouth daily as needed (tremor)    Tremor

## 2018-11-09 NOTE — PROCEDURES
SLEEP STUDY INTERPRETATION  DIAGNOSTIC POLYSOMNOGRAPHY REPORT      Patient: ANIKET PATEL  YOB: 1975  Study Date: 11/8/2018  MRN: [id number]  Referring Provider: Nikolai Arce MD  Ordering Provider: Tamiko Guevara MD    Indications for Polysomnography: The patient is a 43 y old Male who is 6' and weighs 226.0 lbs. His BMI is 30.9, [place] sleepiness scale 11.0 and neck circumference is 44.5 cm. Relevant medical history includes tremor. A diagnostic polysomnogram was performed to evaluate for sleep apnea/parasomnia.    Polysomnogram Data: A full night polysomnogram recorded the standard physiologic parameters including EEG, EOG, EMG, ECG, nasal and oral airflow. Respiratory parameters of chest and abdominal movements were recorded with respiratory inductance plethysmography. Oxygen saturation was recorded by pulse oximetry. Hypopnea scoring rule used: 1B 4%.    Sleep Architecture: Increased arousal index and increased wake after sleep onset.  The total recording time of the polysomnogram was 430.2 minutes. The total sleep time was 368.5 minutes. Sleep latency was normal at 13.2 minutes without the use of a sleep aid. REM latency was 60.0 minutes. Arousal index was increased at 23.1 arousals per hour. Sleep efficiency was normal at 85.7%. Wake after sleep onset was 48.0 minutes. The patient spent 8.5% of total sleep time in Stage N1, 54.7% in Stage N2, 13.0% in Stage N3, and 23.7% in REM. Time in REM supine was 59.0 minutes.    Respiration: Mild supine predominant obstructive sleep apnea.    Events ? The polysomnogram revealed a presence of 29 obstructive, 5 central, and 24 mixed apneas resulting in an apnea index of 9.4 events per hour. There were 15 obstructive hypopneas and 0 central hypopneas resulting in an obstructive hypopnea index of 2.4 and central hypopnea index of 0 events per hour. The combined apnea/hypopnea index was 11.9 events per hour (central apnea/hypopnea index was 0.8 events per  hour). The REM AHI was 6.9 events per hour. The supine AHI was 25.3 events per hour. The RERA index was 7.7 events per hour.  The RDI was 19.5 events per hour.    Snoring - was reported as moderate to loud.    Respiratory rate and pattern - was notable for normal respiratory rate and pattern.    Sustained Sleep Associated Hypoventilation - Transcutaneous carbon dioxide monitoring was not used, however significant hypoventilation was not suggested by oximetry.    Sleep Associated Hypoxemia - (Greater than 5 minutes O2 sat at or below 88%) was not present. Baseline oxygen saturation was 95.5%. Lowest oxygen saturation was 86.0%. Time spent less than or equal to 88% was 0 minutes. Time spent less than or equal to 89% was 0 minutes.    Movement Activity: Frequent periodic limb movements most of which were not associated with arousals.    Periodic Limb Activity - There were 260 PLMs during the entire study. The PLM index was 42.3 movements per hour. The PLM Arousal Index was 2.0 per hour.    REM EMG Activity - Excessive transient/sustained muscle activity was not present.    Nocturnal Behavior - Abnormal sleep related behaviors were not noted.    Bruxism - None apparent.    Cardiac Summary: Normal sinus rhythm and rates with rare non conducted beat. (See image)  The average pulse rate was 67.0 bpm. The minimum pulse rate was 49.9 bpm while the maximum pulse rate was 101.2 bpm.      Ten second window-non conducted p wave       Assessment:     Mild supine predominant obstructive sleep apnea.    Increased arousal index and increased wake after sleep onset.    Frequent periodic limb movements most of which were not associated with arousals.    Normal REM atonia observed.    Normal sinus rhythm and rates with rare non conducted beat. (see image)      Recommendations:    Encourage lateral sleep positions.    If clinically indicated, consider referral to sleep dentistry for mandibular repositioning appliance to treat mild EMMA.      Alternatively, could consider empirically initiated with Auto?titrating PAP therapy with a range of 5 to 15 cmH2O. Recommend clinical follow up with sleep management team.    Follow up with Primary care or cardiology for 12 Lead EKG and further evaluation for AV conduction block.    Advice regarding the risks of drowsy driving.    Suggest optimizing sleep schedule and avoiding sleep deprivation.    Weight management (BMI > 30).    Pharmacologic therapy should be used for management of restless legs syndrome only if present and clinically indicated and not based on the presence of periodic limb movements alone.    Diagnostic Codes:   Obstructive Sleep Apnea G47.33        _____________________________________   Electronically Signed By: Tamiko Guevara MD 11/9/2018

## 2018-11-12 LAB — SLPCOMP: NORMAL

## 2018-11-14 PROBLEM — G47.33 OSA (OBSTRUCTIVE SLEEP APNEA): Status: ACTIVE | Noted: 2018-11-14

## 2018-11-19 ENCOUNTER — ALLIED HEALTH/NURSE VISIT (OUTPATIENT)
Dept: CARDIOLOGY | Facility: CLINIC | Age: 43
End: 2018-11-19
Attending: INTERNAL MEDICINE
Payer: COMMERCIAL

## 2018-11-19 DIAGNOSIS — G47.33 OSA (OBSTRUCTIVE SLEEP APNEA): ICD-10-CM

## 2018-11-19 DIAGNOSIS — I44.2 COMPLETE AV BLOCK (H): ICD-10-CM

## 2018-11-19 PROCEDURE — 93226 XTRNL ECG REC<48 HR SCAN A/R: CPT | Mod: ZF

## 2018-11-19 PROCEDURE — 93227 XTRNL ECG REC<48 HR R&I: CPT | Mod: ZP | Performed by: INTERNAL MEDICINE

## 2018-11-19 PROCEDURE — 93227 XTRNL ECG REC<48 HR R&I: CPT | Performed by: INTERNAL MEDICINE

## 2018-11-19 PROCEDURE — 93225 XTRNL ECG REC<48 HRS REC: CPT | Mod: ZF

## 2018-11-19 NOTE — NURSING NOTE
Per Dr. Tamiko Guevara, patient to have 48 hour Holter monitor placed.  Diagnosis: Complete AV block  Monitor placed: Yes  Patient Instructed: Yes  Patient verbalized understanding: Yes  Holter # 5  Placed by Kimberlee Burns CMA

## 2018-11-19 NOTE — MR AVS SNAPSHOT
After Visit Summary   11/19/2018    Eleazar Herrera    MRN: [id number]           Patient Information     Date Of Birth          1975        Visit Information        Provider Department      11/19/2018 10:00 AM Tech, Uc Cvc Monitor, [facility]        Today's Diagnoses     Complete AV block (H)        EMMA (obstructive sleep apnea)           Follow-ups after your visit        Your next 10 appointments already scheduled     Nov 28, 2018  2:20 PM CST   Return Sleep Patient with Tamiko Guevara MD   [facility] ([facility])    [address] [place] [id number] [phone]            Sep 20, 2019  2:45 PM CDT   (Arrive by 2:30 PM)   Return Movement Disorder with Nikolai Arce MD   [facility] ([facility])    [address] [place] [id number] [phone]              Who to contact     If you have questions or need follow up information about today's clinic visit or your schedule please contact [facility] directly at [phone].  Normal or non-critical lab and imaging results will be communicated to you by [employer], letter or phone within 4 business days after the clinic has received the results. If you do not hear from us within 7 days, please contact the clinic through [employer] or phone. If you have a critical or abnormal lab result, we will notify you by phone as soon as possible.  Submit refill requests through [employer] or call your pharmacy and they will forward the refill request to us. Please allow 3 business days for your refill to be completed.          Additional Information About Your Visit        [employer] Information     [employer] gives you secure access to your electronic health record. If you see a primary care provider, you can also send messages to your care team and make appointments. If you have questions, please call  your primary care clinic.  If you do not have a primary care provider, please call [phone] and they will assist you.        Care EveryWhere ID     This is your Care EveryWhere ID. This could be used by other organizations to access your [place] medical records  [id number]         Blood Pressure from Last 3 Encounters:   10/05/18 125/76   09/21/18 122/73   06/06/17 128/62    Weight from Last 3 Encounters:   10/05/18 102.5 kg (226 lb)   09/21/18 103.3 kg (227 lb 12.8 oz)   06/06/17 102.1 kg (225 lb)              We Performed the Following     Holter Monitor 48 hour - Adult        Primary Care Provider Office Phone # Fax #    Scot Raygoza [phone]       [address] [facility] [address]        Equal Access to Services     [facility]: Hadii jana skaggs hadasho Soconchita, waaxda luqadaha, qaybta kaalmada adejamesyada, nila parekh haybrijesh alston . So [facility] [phone].    ATENCIÓN: Si habla español, tiene a pham disposición servicios gratuitos de asistencia lingüística. Llame al [phone].    We comply with applicable federal civil rights laws and [place] laws. We do not discriminate on the basis of race, color, national origin, age, disability, sex, sexual orientation, or gender identity.            Thank you!     Thank you for choosing [facility]  for your care. Our goal is always to provide you with excellent care. Hearing back from our patients is one way we can continue to improve our services. Please take a few minutes to complete the written survey that you may receive in the mail after your visit with us. Thank you!             Your Updated Medication List - Protect others around you: Learn how to safely use, store and throw away your medicines at www.disposemymeds.org.          This list is accurate as of 11/19/18  5:53 PM.  Always use your most recent med list.                   Brand Name Dispense Instructions for use Diagnosis    propranolol 20  MG tablet    INDERAL    90 tablet    Take 1 tablet (20 mg) by mouth daily as needed (tremor)    Tremor

## 2018-11-28 ENCOUNTER — OFFICE VISIT (OUTPATIENT)
Dept: SLEEP MEDICINE | Facility: CLINIC | Age: 43
End: 2018-11-28
Payer: COMMERCIAL

## 2018-11-28 VITALS
WEIGHT: 225 LBS | BODY MASS INDEX: 30.48 KG/M2 | HEART RATE: 62 BPM | RESPIRATION RATE: 18 BRPM | SYSTOLIC BLOOD PRESSURE: 121 MMHG | OXYGEN SATURATION: 97 % | HEIGHT: 72 IN | DIASTOLIC BLOOD PRESSURE: 77 MMHG

## 2018-11-28 DIAGNOSIS — G47.33 OSA (OBSTRUCTIVE SLEEP APNEA): Primary | ICD-10-CM

## 2018-11-28 DIAGNOSIS — G47.50 PARASOMNIA: ICD-10-CM

## 2018-11-28 LAB — INTERPRETATION MONITOR -MUSE: NORMAL

## 2018-11-28 PROCEDURE — 99214 OFFICE O/P EST MOD 30 MIN: CPT | Performed by: INTERNAL MEDICINE

## 2018-11-28 NOTE — PROGRESS NOTES
Chief complaint: 43-year-old gentleman here for follow-up of sleep study    History of Present Illness: 43-year-old gentleman with history of tremor, snoring, fatigue.  He underwent polysomnography and is here today to review results.  He also has a history of sleep talking and some kicking.  He denies symptoms of restless legs.  The sleep study results were reviewed in detail with him he has a copy of the results and they are summarized as follows:  Study date 11/8/2018   Overall AHI 11.9, supine AHI 25.    No significant dream enactment behaviors identified.    Normal REM atonia intact.    Heart rates were normal.  Rare nonconducted P wave was noted.  Snoring was noted.    Holter monitor results also reviewed with patient.    [place] Seepiness Scale:6 (Less than 10 normal)    Past Medical History:   Diagnosis Date     Change in hearing 9/21/2018     Family history of Parkinson's disease 11/1/2011     Lumbago 11/1/2011     Snores 9/21/2018     Tremor      Tremor 10/31/2012       No Known Allergies    Current Outpatient Prescriptions   Medication     propranolol (INDERAL) 20 MG tablet     No current facility-administered medications for this visit.        Social History     Social History     Marital status: Single     Spouse name: N/A     Number of children: 0     Years of education: N/A     Occupational History      [employer]     Social History Main Topics     Smoking status: Never Smoker     Smokeless tobacco: Never Used     Alcohol use Yes      Comment: OCCASSIONALLY     Drug use: No     Sexual activity: Yes     Partners: Female     Other Topics Concern     Parent/Sibling W/ Cabg, Mi Or Angioplasty Before 65f 55m? 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 He is living in [facility]    Single    Has a house mate; who lives upstairs    No dependents.    Broke up  with girl friend - after 6 yrs.    She had 2 kids: 12 yrs and 9 yrs    No smoking    Social alcohol.            Mother lives in [place], [place] -- near [place]    She has parkinson and is hallucinating on mirapex    She is not driving and was born in 1939 and is 73 yrs old.            Family history of parkinson in his mother with onset of her symptoms in her late 60s and she is 73 yrs old now.        Maternal grandfather had parkinson as well and passed away in his 80s.        2015    He lives in [place] and he has a fiance who is pregnant at this time with their child.     She is a heads of sceniose for [employer]               Family History   Problem Relation Age of Onset     Parkinsonism Mother      Restless Leg Syndrome Father      Parkinsonism Maternal Grandfather      Diabetes Sister      type i     C.A.D. No family hx of      Cancer No family hx of          EXAM: Pleasant, alert, no distress  /77  Pulse 62  Resp 18  Ht 1.829 m (6')  Wt 102.1 kg (225 lb)  SpO2 97%  BMI 30.52 kg/m2  Psychiatric: Mood and affect appear normal    PSG date:11/8/2018  Wt:226 BMI 30.9  AHI: 11.9  Supine AHI 25  Normal REM atonia intact    Holter Monitor reviewed: rare non conducted p waves likely vagally mediated.    ASSESSMENT: 43-year-old gentleman with overall mild but supine predominant obstructive sleep apnea.  No evidence to support REM sleep behavior disorder as etiology of parasomnia.  Its possible that arousals that her respiratory related may be provoking some parasomnia.  Patient has fatigue but does not have excessive drowsiness.  He does not have significant cardiovascular risk factors.    PLAN: We discussed treatment options including oral appliances and CPAP.  Plan to proceed with oral appliance therapy.  Reassess clinically in about 4 months.  Patient is encouraged to sleep on sides and avoid weight gain.  He found the Holter results are reassuring.  Please see patient instructions for further  details of counseling provided.  A follow-up in 4 months.    Twenty-five minutes spent with patient, >50% spent counseling and coordinating care.      Tamiko Guevara M.D.  Pulmonary/Critical Care/Sleep Medicine    The above note was dictated using voice recognition software and may include typographical errors. Please contact the author for any clarifications.

## 2018-11-28 NOTE — PATIENT INSTRUCTIONS
Sleep on sides  Dental appliance resources recommended by [facility]        [facility]   Sleep Medicine [facility]   Yeison Ranegl DDS, MS     [facility]  [address] [place] [address] [address]  [place] [address]   Appointments: [phone] Ext: [phone]  Fax: [phone]   [email or web address]   Dental and Oral Surgery Clinic   Reddy Suazo DDS   [address] [facility] [address]   [place] [address]   Appointments: [phone]   [email or web address]   [email or web address]       Snoring and Sleep Apnea   Dental Treatment Center   Samuel Smith DDS   [address]   [address]   [place] [address]   Appointments: [phone]   Fax: [phone]   [email or web address]       [facility]  Gene Dorantes DDS- takes medicare  [address], [place] [address]  [address], [place] [address]  [phone]; [phone] (fax)  [employer]    [facility]  Arnaldo Dejesus DDS  [facility]  [address]  [phone]  [place] [address]  [facility]   [facility]   Reddy Suazo DDS   [facility]   [address],   [address]   [place] [address]   Appointments: [phone]   Fax: [phone]     [place] Office   Daniel Baron DDS, MS   [[facility] Medicare]  [facility]   [address]   [address]   [place] [address]   Appointments: [phone]   Fax: [phone]   Dr. Baron accepts Medicaid patients.     [place] Office  Yisel Valdez BDS, MS  [address]  [address]  [place] [address]  Appointments: [phone]  Fax: [phone]    [facility]  Cuate Santhosh DDS  [DME Medicare]  [address]  [address]  [place] [address]  Appointments [phone]  Fax: [phone]    +[facility]   [email or web address]     Malini Painting DDS, PhD, MS   [place] Office   [facility]   [address],   [address]   [place] [address]   Appointments: [phone]   Fax: [phone]     [facility] [place]   [address]   [address]   [place] [address]   Appointments: [phone]   Fax: [phone]     [facility]  Nany Rob DDS  [facility] [address] [address] [phone] (Office)   [phone] (Fax    If you wish to choose your own dental sleep dentist, you may identify a provider close to you: [email or web address]

## 2019-02-05 ENCOUNTER — OFFICE VISIT (OUTPATIENT)
Dept: SURGERY | Facility: CLINIC | Age: 44
End: 2019-02-05
Payer: COMMERCIAL

## 2019-02-05 VITALS
BODY MASS INDEX: 31.29 KG/M2 | WEIGHT: 231 LBS | SYSTOLIC BLOOD PRESSURE: 139 MMHG | HEART RATE: 65 BPM | DIASTOLIC BLOOD PRESSURE: 74 MMHG | HEIGHT: 72 IN

## 2019-02-05 DIAGNOSIS — M54.42 CHRONIC RIGHT-SIDED LOW BACK PAIN WITH LEFT-SIDED SCIATICA: ICD-10-CM

## 2019-02-05 DIAGNOSIS — G89.29 CHRONIC RIGHT-SIDED LOW BACK PAIN WITH LEFT-SIDED SCIATICA: ICD-10-CM

## 2019-02-05 DIAGNOSIS — D17.1 BENIGN LIPOMATOUS NEOPLASM OF SKIN AND SUBCUTANEOUS TISSUE OF TRUNK: Primary | ICD-10-CM

## 2019-02-05 PROCEDURE — 99204 OFFICE O/P NEW MOD 45 MIN: CPT | Performed by: SURGERY

## 2019-02-05 ASSESSMENT — MIFFLIN-ST. JEOR: SCORE: 1980.81

## 2019-02-05 NOTE — PROGRESS NOTES
Dear Scot Urena  I was asked to see this patient by Scot Raygoza for please see below.  I have seen Eleazar Herrera and as you know his chief complaint is lipomas causing back pain.    Noticed about 10 years ago and getting bigger. Has one under left shoulder blade.   Some on the front and side.     HPI:  Patient is a 44 year old male  with complaints see above  The patient noticed the symptoms about 10 years ago.    Patient has family history of this  Problems mother  nothing makes the episode better.      Review Of Systems  Respiratory: No shortness of breath, dyspnea on exertion, cough, or hemoptysis  Cardiovascular: negative  Gastrointestinal: negative  Endocrine: negative  :  negative  /74   Pulse 65   Ht 1.829 m (6')   Wt 104.8 kg (231 lb)   BMI 31.33 kg/m      Past Medical History:   Diagnosis Date     Change in hearing 9/21/2018     Family history of Parkinson's disease 11/1/2011     Lumbago 11/1/2011     Snores 9/21/2018     Tremor      Tremor 10/31/2012       Past Surgical History:   Procedure Laterality Date     DISCECTOMY LUMBAR POSTERIOR MICROSCOPIC ONE LEVEL  5/23/2013    Procedure: DISCECTOMY LUMBAR POSTERIOR MICROSCOPIC ONE LEVEL;  Left Lumbar 5-Sacral 1 Microdiscectomy ;  Surgeon: James Curran MD;  [facility]     epidural injection      back problem     SINUS SURGERY       TONSILLECTOMY         Social History     Socioeconomic History     Marital status: Single     Spouse name: Not on file     Number of children: 0     Years of education: Not on file     Highest education level: Not on file   Social Needs     Financial resource strain: Not on file     Food insecurity - worry: Not on file     Food insecurity - inability: Not on file     Transportation needs - medical: Not on file     Transportation needs - non-medical: Not on file   Occupational History     Employer: [employer]   Tobacco Use     Smoking status: Never Smoker      Smokeless tobacco: Never Used   Substance and Sexual Activity     Alcohol use: Yes     Comment: OCCASSIONALLY     Drug use: No     Sexual activity: Yes     Partners: Female   Other Topics Concern     Parent/sibling w/ CABG, MI or angioplasty before 65F 55M? 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 He is living in [facility]    Single    Has a house mate; who lives upstairs    No dependents.    Broke up with girl friend - after 6 yrs.    She had 2 kids: 12 yrs and 9 yrs    No smoking    Social alcohol.            Mother lives in [place], [place] -- near [place]    She has parkinson and is hallucinating on mirapex    She is not driving and was born in 1939 and is 73 yrs old.            Family history of parkinson in his mother with onset of her symptoms in her late 60s and she is 73 yrs old now.        Maternal grandfather had parkinson as well and passed away in his 80s.        2015    He lives in [place] and he has a fiance who is pregnant at this time with their child.     She is a heads of commerce for [employer]           Current Outpatient Medications   Medication Sig Dispense Refill     propranolol (INDERAL) 20 MG tablet Take 1 tablet (20 mg) by mouth daily as needed (tremor) (Patient not taking: Reported on 10/5/2018) 90 tablet 3       10 Point review of systems all others are negative.   Above was reviewed  Physical exam: /74   Pulse 65   Ht 1.829 m (6')   Wt 104.8 kg (231 lb)   BMI 31.33 kg/m     Patient able to get up on table without difficulty.   Patient is alert and orientated.   Head eyes, nose and mouth within normal limits.  No supraclavicular or cervical adenopathy palpated.  Thyroid within normal limits.  No carotid bruits auscultated.  Lungs are clear to auscultation  Heart is regular rate and rhythm with no murmur or thrills noted.  No costal vertebral  angle tenderness noted.  Abdomen is abdomen is soft without significant tenderness, masses, organomegaly or guarding  bowel sounds are positive and no caput medusa noted.  No obvious hernias noted.      Skin was warm and pink  Normal Affect      Lower extremity edema is not present.      Assessment: on his left scapula it is obviously sticking out more than the other side.  Think that I can feel at least displaced tissue on the lateral border of the scapula.  But not sure that can feel an obvious lipoma.  This will need an MRI to make that that is all it is.    Has multiple lipomas on the abdomen and the the back none below the waist.   Plan to do will get MRI of the chest to look at the mass behind the scapula.  And if a lipoma would remove in the OR with  General anesthesia  And can remove other lipomas on the back that I feel.  And other areas that can be reached depending on what position he needs to be placed to remove the one in the left scapula. .    Risks of surgery include damage to nerves, bleeding, infection, damage to  Vessels, recurrence.   Please follow up with me to go over these results.   Also has increasing pain in the lumbar area and has had surgery on this and has trouble walking lately, so will also get the lumbar MRI.     Time spent with the patient with greater that 50% of the time in discussion was 30 minutes.  In discussing the options .      Ari Armando MD

## 2019-02-05 NOTE — LETTER
2/5/2019         RE: Eleazar Herrera  [address] [id number]        Dear Colleague,    Thank you for referring your patient, Eleazar Herrera, to the [facility]. Please see a copy of my visit note below.    Dear Scot Urena  I was asked to see this patient by Scot Raygoza for please see below.  I have seen Eleazar Herrera and as you know his chief complaint is lipomas causing back pain.    Noticed about 10 years ago and getting bigger. Has one under left shoulder blade.   Some on the front and side.     HPI:  Patient is a 44 year old male  with complaints see above  The patient noticed the symptoms about 10 years ago.    Patient has family history of this  Problems mother  nothing makes the episode better.      Review Of Systems  Respiratory: No shortness of breath, dyspnea on exertion, cough, or hemoptysis  Cardiovascular: negative  Gastrointestinal: negative  Endocrine: negative  :  negative  /74   Pulse 65   Ht 1.829 m (6')   Wt 104.8 kg (231 lb)   BMI 31.33 kg/m       Past Medical History:   Diagnosis Date     Change in hearing 9/21/2018     Family history of Parkinson's disease 11/1/2011     Lumbago 11/1/2011     Snores 9/21/2018     Tremor      Tremor 10/31/2012       Past Surgical History:   Procedure Laterality Date     DISCECTOMY LUMBAR POSTERIOR MICROSCOPIC ONE LEVEL  5/23/2013    Procedure: DISCECTOMY LUMBAR POSTERIOR MICROSCOPIC ONE LEVEL;  Left Lumbar 5-Sacral 1 Microdiscectomy ;  Surgeon: James Curran MD;  [facility]     epidural injection      back problem     SINUS SURGERY       TONSILLECTOMY         Social History     Socioeconomic History     Marital status: Single     Spouse name: Not on file     Number of children: 0     Years of education: Not on file     Highest education level: Not on file   Social Needs     Financial resource strain: Not on file     Food insecurity - worry: Not on file     Food  insecurity - inability: Not on file     Transportation needs - medical: Not on file     Transportation needs - non-medical: Not on file   Occupational History     Employer: [employer]   Tobacco Use     Smoking status: Never Smoker     Smokeless tobacco: Never Used   Substance and Sexual Activity     Alcohol use: Yes     Comment: OCCASSIONALLY     Drug use: No     Sexual activity: Yes     Partners: Female   Other Topics Concern     Parent/sibling w/ CABG, MI or angioplasty before 65F 55M? No   Social History Narrative    : [employer] -- working 10-14 hour days    Lives in a foreclosed house in 2008 and is remodeling it. He is living in [facility]    Single    Has a house mate; who lives upstairs    No dependents.    Broke up with girl friend - after 6 yrs.    She had 2 kids: 12 yrs and 9 yrs    No smoking    Social alcohol.            Mother lives in [place], [place] -- near [place]    She has parkinson and is hallucinating on mirapex    She is not driving and was born in 1939 and is 73 yrs old.            Family history of parkinson in his mother with onset of her symptoms in her late 60s and she is 73 yrs old now.        Maternal grandfather had parkinson as well and passed away in his 80s.        2015    He lives in [place] and he has a fiance who is pregnant at this time with their child.     She is a heads of commerce for [employer]           Current Outpatient Medications   Medication Sig Dispense Refill     propranolol (INDERAL) 20 MG tablet Take 1 tablet (20 mg) by mouth daily as needed (tremor) (Patient not taking: Reported on 10/5/2018) 90 tablet 3       10 Point review of systems all others are negative.   Above was reviewed  Physical exam: /74   Pulse 65   Ht 1.829 m (6')   Wt 104.8 kg (231 lb)   BMI 31.33 kg/m      Patient able to get up on table without difficulty.   Patient is alert and orientated.   Head eyes, nose and mouth  within normal limits.  No supraclavicular or cervical adenopathy palpated.  Thyroid within normal limits.  No carotid bruits auscultated.  Lungs are clear to auscultation  Heart is regular rate and rhythm with no murmur or thrills noted.  No costal vertebral angle tenderness noted.  Abdomen is abdomen is soft without significant tenderness, masses, organomegaly or guarding  bowel sounds are positive and no caput medusa noted.  No obvious hernias noted.      Skin was warm and pink  Normal Affect      Lower extremity edema is not present.      Assessment: on his left scapula it is obviously sticking out more than the other side.  Think that I can feel at least displaced tissue on the lateral border of the scapula.  But not sure that can feel an obvious lipoma.  This will need an MRI to make that that is all it is.    Has multiple lipomas on the abdomen and the the back none below the waist.   Plan to do will get MRI of the chest to look at the mass behind the scapula.  And if a lipoma would remove in the OR with  General anesthesia  And can remove other lipomas on the back that I feel.  And other areas that can be reached depending on what position he needs to be placed to remove the one in the left scapula. .    Risks of surgery include damage to nerves, bleeding, infection, damage to  Vessels, recurrence.   Please follow up with me to go over these results.   Also has increasing pain in the lumbar area and has had surgery on this and has trouble walking lately, so will also get the lumbar MRI.     Time spent with the patient with greater that 50% of the time in discussion was 30 minutes.  In discussing the options .      Ari Armando MD      Again, thank you for allowing me to participate in the care of your patient.        Sincerely,        Ari Armando MD

## 2019-02-05 NOTE — PATIENT INSTRUCTIONS
Assessment: on his left scapula it is obviously sticking out more than the other side.  Think that I can feel at least displaced tissue on the lateral border of the scapula.  But not sure that can feel an obvious lipoma.  This will need an MRI to make that that is all it is.    Has multiple lipomas on the abdomen and the the back none below the waist.   Plan to do will get MRI of the chest to look at the mass behind the scapula.  And if a lipoma would remove in the OR with  General anesthesia  And can remove other lipomas on the back that I feel.  And other areas that can be reached depending on what position he needs to be placed to remove the one in the left scapula. .  Also has increasing pain in the lumbar area and has had surgery on this and has trouble walking lately, so will also get the lumbar MRI.     Risks of surgery include damage to nerves, bleeding, infection, damage to  Vessels, recurrence.   Please follow up with me to go over these results.

## 2019-02-06 ENCOUNTER — ANCILLARY PROCEDURE (OUTPATIENT)
Dept: MRI IMAGING | Facility: CLINIC | Age: 44
End: 2019-02-06
Attending: SURGERY
Payer: COMMERCIAL

## 2019-02-06 DIAGNOSIS — M54.42 CHRONIC RIGHT-SIDED LOW BACK PAIN WITH LEFT-SIDED SCIATICA: ICD-10-CM

## 2019-02-06 DIAGNOSIS — D17.1 BENIGN LIPOMATOUS NEOPLASM OF SKIN AND SUBCUTANEOUS TISSUE OF TRUNK: ICD-10-CM

## 2019-02-06 DIAGNOSIS — G89.29 CHRONIC RIGHT-SIDED LOW BACK PAIN WITH LEFT-SIDED SCIATICA: ICD-10-CM

## 2019-02-06 PROCEDURE — 72158 MRI LUMBAR SPINE W/O & W/DYE: CPT | Mod: TC

## 2019-02-06 PROCEDURE — A9585 GADOBUTROL INJECTION: HCPCS

## 2019-02-06 PROCEDURE — 73225 MR ANGIO UPR EXTR W/O&W/DYE: CPT | Mod: TC

## 2019-02-06 RX ORDER — GADOBUTROL 604.72 MG/ML
10 INJECTION INTRAVENOUS ONCE
Status: COMPLETED | OUTPATIENT
Start: 2019-02-06 | End: 2019-02-06

## 2019-02-06 RX ADMIN — GADOBUTROL 10 ML: 604.72 INJECTION INTRAVENOUS at 19:50

## 2019-02-11 ENCOUNTER — OFFICE VISIT (OUTPATIENT)
Dept: ORTHOPEDICS | Facility: CLINIC | Age: 44
End: 2019-02-11
Payer: COMMERCIAL

## 2019-02-11 ENCOUNTER — ANCILLARY PROCEDURE (OUTPATIENT)
Dept: GENERAL RADIOLOGY | Facility: CLINIC | Age: 44
End: 2019-02-11
Attending: PEDIATRICS
Payer: COMMERCIAL

## 2019-02-11 VITALS
SYSTOLIC BLOOD PRESSURE: 128 MMHG | WEIGHT: 231 LBS | HEIGHT: 72 IN | BODY MASS INDEX: 31.29 KG/M2 | DIASTOLIC BLOOD PRESSURE: 78 MMHG

## 2019-02-11 DIAGNOSIS — M54.50 CHRONIC BILATERAL LOW BACK PAIN WITHOUT SCIATICA: Primary | ICD-10-CM

## 2019-02-11 DIAGNOSIS — G89.29 CHRONIC LOWER BACK PAIN: ICD-10-CM

## 2019-02-11 DIAGNOSIS — D17.1 LIPOMA OF TORSO: ICD-10-CM

## 2019-02-11 DIAGNOSIS — M54.50 CHRONIC LOWER BACK PAIN: ICD-10-CM

## 2019-02-11 DIAGNOSIS — G89.29 CHRONIC BILATERAL LOW BACK PAIN WITHOUT SCIATICA: Primary | ICD-10-CM

## 2019-02-11 DIAGNOSIS — M54.59 MECHANICAL LOW BACK PAIN: ICD-10-CM

## 2019-02-11 DIAGNOSIS — M51.369 LUMBAR DEGENERATIVE DISC DISEASE: ICD-10-CM

## 2019-02-11 PROCEDURE — 72100 X-RAY EXAM L-S SPINE 2/3 VWS: CPT

## 2019-02-11 PROCEDURE — 99203 OFFICE O/P NEW LOW 30 MIN: CPT | Performed by: PEDIATRICS

## 2019-02-11 ASSESSMENT — MIFFLIN-ST. JEOR: SCORE: 1980.81

## 2019-02-11 NOTE — PROGRESS NOTES
Sports Medicine Clinic Visit    PCP: cSot Raygoza    Eleazar Herrera is a 44 year old male who is seen  in consultation at the request of  Ari Armando M.D. presenting with chronic lower back pain    Injury: He reports a chronic history of lower back pain for 9 years. He has had a discectomy in 2013. He reports the procedure decreased his leg pain, however, has had on and off chronic low back pain since then. He reports he has a lipoma of the skin in his thoracic spine and has difficulty lifting his left arm. He reports due to the pain in his upper back he feels the lower back pain has increased, acutely 2 weeks ago.  He reports no radiating pain to his lower extremities, but he reports muscle spasms occassionally in the lower back. He reports pain from his upper back radiate to the left shoulder. He reports the pain increases with climbing ladders and bending to the left side. He uses an inversion table almost daily.  Overall low back pain has been improving the last couple weeks.  Hasn't done PT in a while.    Eleazar was asked to complete the Oswestry Low Back Disability Index and Jose Alfredo Start Back screening tool.  today in the office.  Disability score: 33.33%.     Location of Pain: upper, lower back  Duration of Pain: 9 year(s)  Rating of Pain at worst: 10/10  Rating of Pain Currently: 4/10  Symptoms are better with: rest  Symptoms are worse with: climbing ladders and bending to the left side  Additional Features:   Positive: none   Negative: swelling, bruising, popping, grinding, catching, locking, instability, paresthesias and numbness  Other evaluation and/or treatments so far consists of: Ibuprofen and Rest  Prior History of related problems: Discectomy of L5-S1 in 2013.    Social History: , desk work    Review of Systems  Skin: no bruising, no swelling  Musculoskeletal: as above  Neurologic: no numbness, paresthesias  Remainder of review of systems is negative including  constitutional, CV, pulmonary, GI, except as noted in HPI or medical history.    Patient's current problem list, past medical and surgical history, and family history were reviewed.    Patient Active Problem List   Diagnosis     Lumbago     Family history of Parkinson's disease     Hyperlipidemia LDL goal <130     Tremor     Lumbosacral disc herniation     Anxiety     Change in hearing     Snores     Sleep disorder     EMMA (obstructive sleep apnea)     Past Medical History:   Diagnosis Date     Change in hearing 9/21/2018     Family history of Parkinson's disease 11/1/2011     Lumbago 11/1/2011     Snores 9/21/2018     Tremor      Tremor 10/31/2012     Past Surgical History:   Procedure Laterality Date     DISCECTOMY LUMBAR POSTERIOR MICROSCOPIC ONE LEVEL  5/23/2013    Procedure: DISCECTOMY LUMBAR POSTERIOR MICROSCOPIC ONE LEVEL;  Left Lumbar 5-Sacral 1 Microdiscectomy ;  Surgeon: James Curran MD;  [facility]     epidural injection      back problem     SINUS SURGERY       TONSILLECTOMY       Family History   Problem Relation Age of Onset     Parkinsonism Mother      Restless Leg Syndrome Father      Parkinsonism Maternal Grandfather      Diabetes Sister         type i     C.A.D. No family hx of      Cancer No family hx of        Objective  /78 (BP Location: Right arm, Patient Position: Chair, Cuff Size: Adult Regular)   Ht 1.829 m (6')   Wt 104.8 kg (231 lb)   BMI 31.33 kg/m      GENERAL APPEARANCE: healthy, alert and no distress   GAIT: NORMAL  SKIN: no suspicious lesions or rashes  HEENT: Sclera clear, anicteric  CV: good peripheral pulses  RESP: Breathing not labored  NEURO: Normal strength and tone, mentation intact and speech normal  PSYCH:  mentation appears normal and affect normal/bright    Low back exam:  Inspection:     normal skin       normal vascular       normal lymphatic       Lipoma left side of thoracic spine x2, well healed lumbar spine    Posture:      rounded  shoulders and upper back       lumbar lordosis diminished    Foot Inspection:     no deformity noted    Tender:     None currently    Non Tender:     remainder of lumbar spine    ROM:      full flexion       limited extension due to pain    Strength:     hip flexion 5/5 bilateral       knee extension 5/5 bilateral       ankle dorsiflexion 5/5 bilateral       ankle plantarflexion 5/5 bilateral       dorsiflexion of the great toe 5/5 bilateral       able to heel and toe walk    Reflexes:     patellar (L3, L4) symmetric normal       achilles tendons (S1) symmetric normal    Sensation:    grossly intact throughout lower extremities    Special tests:      straight leg raise left negative        straight leg raise right negative       neg (-) CARLTON  bilateral       neg (-) FADIR  bilateral    Radiology  I ordered, visualized and reviewed these images with the patient  2 XR views of lumbar spine reviewed: degenerative change at L5-S1 level  - will follow official read    I visualized and reviewed these images with the patient  MRI LUMBAR SPINE WITHOUT AND WITH CONTRAST   2/6/2019 8:31 PM   HISTORY: Chronic right-sided low back pain with left-sided sciatica.   TECHNIQUE: Multiplanar, multisequence MRI of the lumbar spine without  and with 10 mL Gadavist.   COMPARISON: Lumbar spine MR 3/18/2015, 5/7/2013, and 12/15/2009.   FINDINGS: There are 5 lumbar-type vertebral bodies assumed for the  purposes of this dictation. The distal spinal cord and cauda equina  appear normal without abnormal enhancement.   Alignment of the lumbar spine is normal. No loss of vertebral body  height. There are no destructive osseous lesions. Marked loss of  intervertebral disc height with disc desiccation and Modic type  degenerative endplate changes at L5-S1. There is STIR hyperintense  endplate marrow edema at L5-S1 asymmetrically increased in the right.  Mild loss of disc height also at L3-4 and L4-5.   Level by level as follows:   T12-L1: No  significant spinal canal or neural foraminal narrowing.   L1-L2: No significant spinal canal or neural foraminal narrowing.   L2-L3: No significant spinal canal or neural foraminal narrowing.   L3-L4: Mild posterior disc bulge results in mild bilateral neural  foraminal narrowing. No spinal canal narrowing.   L4-L5: Mild posterior disc bulge results in mild to moderate bilateral  neural foraminal narrowing. No significant spinal canal narrowing.   L5-S1: Postoperative changes of left-sided laminectomy and previous  discectomy. Again seen is mild circumferential disc bulge and endplate  osteophytic spurring with superimposed left subarticular disc  protrusion. Disc material abuts but does not significantly displace  the left S1 nerve root in the lateral recess. There is mild bilateral  neural foraminal narrowing. No significant spinal canal narrowing.  Paraspinous soft tissues: Normal.                                                            IMPRESSION:    1. Postoperative changes of left-sided laminectomy and discectomy at  L5-S1 again seen. Slight worsening of circumferential disc bulge and  endplate osteophytic spurring at this level. Persistent superimposed  left subarticular disc protrusion without significant compression of  the vascular structures. Mild bilateral neural foraminal narrowing is  not significantly changed. Endplate marrow edema at L5-S1 has slightly  increased since prior.  2. Mild degenerative disc changes at L3-4 and L4-5 without significant  change since prior.    Assessment:  1. Chronic bilateral low back pain without sciatica    2. Lumbar degenerative disc disease    3. Mechanical low back pain    4. Lipoma of torso      Chronic low back pain with evidence of degenerative changes and mild disc herniation on MRI.  Overall reassuring exam currently with improving pain.  We discussed physical therapy for core strengthening, patient may wait until he has surgery on his thoracic lipoma.  We  also discussed potential for further treatments if symptoms worsen.      Plan:  - Today's Plan of Care:  Monitor symptoms    -We also discussed other future treatment options:  Referral to spine surgeon, Rehab: Physical Therapy or Steroid injection of lower back    Follow Up: as needed    Concerning signs and symptoms were reviewed.  The patient expressed understanding of this management plan and all questions were answered at this time.    Thanks for the opportunity to participate in the care of this patient, I will keep you updated on their progress.    CC: Ari Daugherty MD CAQ  Primary Care Sports Medicine  [facility]

## 2019-02-11 NOTE — PATIENT INSTRUCTIONS
Plan:  - Today's Plan of Care:  Monitor symptoms    -We also discussed other future treatment options:  Referral to spine surgeon, Rehab: Physical Therapy or Steroid injection of lower back    Follow Up: as needed    If you have any further questions for your physician or physician s care team you can call [phone] and use option 3 to leave a voice message. Calls received during business hours will be returned same day.

## 2019-02-11 NOTE — LETTER
2/11/2019         RE: Eleazar Herrera  [address] [facility] [address]        Dear Colleague,    Thank you for referring your patient, Eleazar Herrera, to the [facility] [place]. Please see a copy of my visit note below.    Sports Medicine Clinic Visit    PCP: Scot Raygoza    Eleazar Herrera is a 44 year old male who is seen  in consultation at the request of  Ari Armando M.D. presenting with chronic lower back pain    Injury: He reports a chronic history of lower back pain for 9 years. He has had a discectomy in 2013. He reports the procedure decreased his leg pain, however, has had on and off chronic low back pain since then. He reports he has a lipoma of the skin in his thoracic spine and has difficulty lifting his left arm. He reports due to the pain in his upper back he feels the lower back pain has increased, acutely 2 weeks ago.  He reports no radiating pain to his lower extremities, but he reports muscle spasms occassionally in the lower back. He reports pain from his upper back radiate to the left shoulder. He reports the pain increases with climbing ladders and bending to the left side. He uses an inversion table almost daily.  Overall low back pain has been improving the last couple weeks.  Hasn't done PT in a while.    Eleazar was asked to complete the Oswestry Low Back Disability Index and Jose Alfredo Start Back screening tool.  today in the office.  Disability score: 33.33%.     Location of Pain: upper, lower back  Duration of Pain: 9 year(s)  Rating of Pain at worst: 10/10  Rating of Pain Currently: 4/10  Symptoms are better with: rest  Symptoms are worse with: climbing ladders and bending to the left side  Additional Features:   Positive: none   Negative: swelling, bruising, popping, grinding, catching, locking, instability, paresthesias and numbness  Other evaluation and/or treatments so far consists of: Ibuprofen and Rest  Prior History of  related problems: Discectomy of L5-S1 in 2013.    Social History: , desk work    Review of Systems  Skin: no bruising, no swelling  Musculoskeletal: as above  Neurologic: no numbness, paresthesias  Remainder of review of systems is negative including constitutional, CV, pulmonary, GI, except as noted in HPI or medical history.    Patient's current problem list, past medical and surgical history, and family history were reviewed.    Patient Active Problem List   Diagnosis     Lumbago     Family history of Parkinson's disease     Hyperlipidemia LDL goal <130     Tremor     Lumbosacral disc herniation     Anxiety     Change in hearing     Snores     Sleep disorder     EMMA (obstructive sleep apnea)     Past Medical History:   Diagnosis Date     Change in hearing 9/21/2018     Family history of Parkinson's disease 11/1/2011     Lumbago 11/1/2011     Snores 9/21/2018     Tremor      Tremor 10/31/2012     Past Surgical History:   Procedure Laterality Date     DISCECTOMY LUMBAR POSTERIOR MICROSCOPIC ONE LEVEL  5/23/2013    Procedure: DISCECTOMY LUMBAR POSTERIOR MICROSCOPIC ONE LEVEL;  Left Lumbar 5-Sacral 1 Microdiscectomy ;  Surgeon: James Curran MD;  Location: [facility]     epidural injection      back problem     SINUS SURGERY       TONSILLECTOMY       Family History   Problem Relation Age of Onset     Parkinsonism Mother      Restless Leg Syndrome Father      Parkinsonism Maternal Grandfather      Diabetes Sister         type i     C.A.D. No family hx of      Cancer No family hx of        Objective  /78 (BP Location: Right arm, Patient Position: Chair, Cuff Size: Adult Regular)   Ht 1.829 m (6')   Wt 104.8 kg (231 lb)   BMI 31.33 kg/m       GENERAL APPEARANCE: healthy, alert and no distress   GAIT: NORMAL  SKIN: no suspicious lesions or rashes  HEENT: Sclera clear, anicteric  CV: good peripheral pulses  RESP: Breathing not labored  NEURO: Normal strength and tone, mentation intact  and speech normal  PSYCH:  mentation appears normal and affect normal/bright    Low back exam:  Inspection:     normal skin       normal vascular       normal lymphatic       Lipoma left side of thoracic spine x2, well healed lumbar spine    Posture:      rounded shoulders and upper back       lumbar lordosis diminished    Foot Inspection:     no deformity noted    Tender:     None currently    Non Tender:     remainder of lumbar spine    ROM:      full flexion       limited extension due to pain    Strength:     hip flexion 5/5 bilateral       knee extension 5/5 bilateral       ankle dorsiflexion 5/5 bilateral       ankle plantarflexion 5/5 bilateral       dorsiflexion of the great toe 5/5 bilateral       able to heel and toe walk    Reflexes:     patellar (L3, L4) symmetric normal       achilles tendons (S1) symmetric normal    Sensation:    grossly intact throughout lower extremities    Special tests:      straight leg raise left negative        straight leg raise right negative       neg (-) CARLTON  bilateral       neg (-) FADIR  bilateral    Radiology  I ordered, visualized and reviewed these images with the patient  2 XR views of lumbar spine reviewed: degenerative change at L5-S1 level  - will follow official read    I visualized and reviewed these images with the patient  MRI LUMBAR SPINE WITHOUT AND WITH CONTRAST   2/6/2019 8:31 PM   HISTORY: Chronic right-sided low back pain with left-sided sciatica.   TECHNIQUE: Multiplanar, multisequence MRI of the lumbar spine without  and with 10 mL Gadavist.   COMPARISON: Lumbar spine MR 3/18/2015, 5/7/2013, and 12/15/2009.   FINDINGS: There are 5 lumbar-type vertebral bodies assumed for the  purposes of this dictation. The distal spinal cord and cauda equina  appear normal without abnormal enhancement.   Alignment of the lumbar spine is normal. No loss of vertebral body  height. There are no destructive osseous lesions. Marked loss of  intervertebral disc height with  disc desiccation and Modic type  degenerative endplate changes at L5-S1. There is STIR hyperintense  endplate marrow edema at L5-S1 asymmetrically increased in the right.  Mild loss of disc height also at L3-4 and L4-5.   Level by level as follows:   T12-L1: No significant spinal canal or neural foraminal narrowing.   L1-L2: No significant spinal canal or neural foraminal narrowing.   L2-L3: No significant spinal canal or neural foraminal narrowing.   L3-L4: Mild posterior disc bulge results in mild bilateral neural  foraminal narrowing. No spinal canal narrowing.   L4-L5: Mild posterior disc bulge results in mild to moderate bilateral  neural foraminal narrowing. No significant spinal canal narrowing.   L5-S1: Postoperative changes of left-sided laminectomy and previous  discectomy. Again seen is mild circumferential disc bulge and endplate  osteophytic spurring with superimposed left subarticular disc  protrusion. Disc material abuts but does not significantly displace  the left S1 nerve root in the lateral recess. There is mild bilateral  neural foraminal narrowing. No significant spinal canal narrowing.  Paraspinous soft tissues: Normal.                                                            IMPRESSION:    1. Postoperative changes of left-sided laminectomy and discectomy at  L5-S1 again seen. Slight worsening of circumferential disc bulge and  endplate osteophytic spurring at this level. Persistent superimposed  left subarticular disc protrusion without significant compression of  the vascular structures. Mild bilateral neural foraminal narrowing is  not significantly changed. Endplate marrow edema at L5-S1 has slightly  increased since prior.  2. Mild degenerative disc changes at L3-4 and L4-5 without significant  change since prior.    Assessment:  1. Chronic bilateral low back pain without sciatica    2. Lumbar degenerative disc disease    3. Mechanical low back pain    4. Lipoma of torso      Chronic low  back pain with evidence of degenerative changes and mild disc herniation on MRI.  Overall reassuring exam currently with improving pain.  We discussed physical therapy for core strengthening, patient may wait until he has surgery on his thoracic lipoma.  We also discussed potential for further treatments if symptoms worsen.      Plan:  - Today's Plan of Care:  Monitor symptoms    -We also discussed other future treatment options:  Referral to spine surgeon, Rehab: Physical Therapy or Steroid injection of lower back    Follow Up: as needed    Concerning signs and symptoms were reviewed.  The patient expressed understanding of this management plan and all questions were answered at this time.    Thanks for the opportunity to participate in the care of this patient, I will keep you updated on their progress.    CC: Ari Daugherty MD [place]  Primary Care Sports Medicine  [facility]    Again, thank you for allowing me to participate in the care of your patient.        Sincerely,        Mei Daugherty MD

## 2019-02-12 ENCOUNTER — OFFICE VISIT (OUTPATIENT)
Dept: SURGERY | Facility: CLINIC | Age: 44
End: 2019-02-12
Payer: COMMERCIAL

## 2019-02-12 VITALS
DIASTOLIC BLOOD PRESSURE: 75 MMHG | HEART RATE: 82 BPM | BODY MASS INDEX: 31.29 KG/M2 | SYSTOLIC BLOOD PRESSURE: 128 MMHG | WEIGHT: 231 LBS | HEIGHT: 72 IN

## 2019-02-12 DIAGNOSIS — Q74.0 ABNORMAL PROMINENCE OF SCAPULA: Primary | ICD-10-CM

## 2019-02-12 PROCEDURE — 99214 OFFICE O/P EST MOD 30 MIN: CPT | Performed by: SURGERY

## 2019-02-12 ASSESSMENT — MIFFLIN-ST. JEOR: SCORE: 1980.81

## 2019-02-12 NOTE — PROGRESS NOTES
Patient is here to go over the MRI.  The MRI is not showing anything obvious.  And I think that if we had a comparison of both sides it would show what is causing this left scapula to stick out so far.    We discussed the findings and I did review this with another surgeon who did not see any obvious mass or lipoma either.   I think for cost effectiveness a ct scan of the chest to see both sides less expensively will give a good idea of what is causing the left scapula to stick out further.  If it is all muscle and no tumor as the MRI would suggest then orthopedics may be his best option.  And would be nice to do a less expensive imaging study like a ct scan of the chest.     Time spent with the patient with greater that 50% of the time in discussion was 30 minutes.  In discussing the findings. .      Ari Armando MD        Study Result     MRI LEFT SCAPULA WITHOUT AND WITH CONTRAST  2/6/2019 8:33 PM     HISTORY: Mass in the left scapula needs to be seen well. Benign  lipomatous neoplasm of skin and subcutaneous tissue of trunk.     COMPARISON: None.     TECHNIQUE: Multiplanar MR imaging is performed through the left  posterior upper chest before and after the uneventful intravenous  administration of 10 mL Gadavist contrast. Markers were placed over  the posterior chest wall at the sites of clinical concern. One is  posterior and superior to the scapula, and the second is medial to the  inferior scapula.     FINDINGS: No fracture or osseous lesion is seen. No abnormal marrow  signal intensity is identified. No abnormal soft tissue mass or fluid  collection is seen. No abnormal signal intensity or contrast  enhancement is identified.                                                                       IMPRESSION: Unremarkable MRI of the left posterior chest wall.  Specifically, no abnormal mass is identified.     ANTONY ENCISO MD             [facility] [address]  [facility] [place] [address]  Phone: [phone]  Fax: [phone]                  2/11/2019           RE:      Eleazar Herrera  [address] [facility] [address]           Dear Colleague,     Thank you for referring your patient, Eleazar Herrera, to the [facility] [place]. Please see a copy of my visit note below.     Sports Medicine Clinic Visit     PCP: Scot Raygoza     Eleazar Herrera is a 44 year old male who is seen  in consultation at the request of  Ari Armando M.D. presenting with chronic lower back pain     Injury: He reports a chronic history of lower back pain for 9 years. He has had a discectomy in 2013. He reports the procedure decreased his leg pain, however, has had on and off chronic low back pain since then. He reports he has a lipoma of the skin in his thoracic spine and has difficulty lifting his left arm. He reports due to the pain in his upper back he feels the lower back pain has increased, acutely 2 weeks ago.  He reports no radiating pain to his lower extremities, but he reports muscle spasms occassionally in the lower back. He reports pain from his upper back radiate to the left shoulder. He reports the pain increases with climbing ladders and bending to the left side. He uses an inversion table almost daily.  Overall low back pain has been improving the last couple weeks.  Hasn't done PT in a while.     Eleazar was asked to complete the Oswestry Low Back Disability Index and Jose Alfredo Start Back screening tool.  today in the office.  Disability score: 33.33%.      Location of Pain: upper, lower back  Duration of Pain: 9 year(s)  Rating of Pain at worst: 10/10  Rating of Pain Currently: 4/10  Symptoms are better with: rest  Symptoms are worse with: climbing ladders and bending to the left side  Additional Features:              Positive: none              Negative: swelling, bruising, popping, grinding, catching, locking,  instability, paresthesias and numbness  Other evaluation and/or treatments so far consists of: Ibuprofen and Rest  Prior History of related problems: Discectomy of L5-S1 in 2013.     Social History: , desk work     Review of Systems  Skin: no bruising, no swelling  Musculoskeletal: as above  Neurologic: no numbness, paresthesias  Remainder of review of systems is negative including constitutional, CV, pulmonary, GI, except as noted in HPI or medical history.     Patient's current problem list, past medical and surgical history, and family history were reviewed.         Patient Active Problem List   Diagnosis     Lumbago     Family history of Parkinson's disease     Hyperlipidemia LDL goal <130     Tremor     Lumbosacral disc herniation     Anxiety     Change in hearing     Snores     Sleep disorder     EMMA (obstructive sleep apnea)           Past Medical History:   Diagnosis Date     Change in hearing 9/21/2018     Family history of Parkinson's disease 11/1/2011     Lumbago 11/1/2011     Snores 9/21/2018     Tremor       Tremor 10/31/2012            Past Surgical History:   Procedure Laterality Date     DISCECTOMY LUMBAR POSTERIOR MICROSCOPIC ONE LEVEL   5/23/2013     Procedure: DISCECTOMY LUMBAR POSTERIOR MICROSCOPIC ONE LEVEL;  Left Lumbar 5-Sacral 1 Microdiscectomy ;  Surgeon: James Curran MD;  [facility]     epidural injection         back problem     SINUS SURGERY         TONSILLECTOMY                Family History   Problem Relation Age of Onset     Parkinsonism Mother       Restless Leg Syndrome Father       Parkinsonism Maternal Grandfather       Diabetes Sister           type i     C.A.D. No family hx of       Cancer No family hx of           Objective  /78 (BP Location: Right arm, Patient Position: Chair, Cuff Size: Adult Regular)   Ht 1.829 m (6')   Wt 104.8 kg (231 lb)   BMI 31.33 kg/m        GENERAL APPEARANCE: healthy, alert and no distress   GAIT:  NORMAL  SKIN: no suspicious lesions or rashes  HEENT: Sclera clear, anicteric  CV: good peripheral pulses  RESP: Breathing not labored  NEURO: Normal strength and tone, mentation intact and speech normal  PSYCH:  mentation appears normal and affect normal/bright     Low back exam:  Inspection:     normal skin       normal vascular       normal lymphatic       Lipoma left side of thoracic spine x2, well healed lumbar spine     Posture:      rounded shoulders and upper back       lumbar lordosis diminished     Foot Inspection:     no deformity noted     Tender:     None currently     Non Tender:     remainder of lumbar spine     ROM:      full flexion       limited extension due to pain     Strength:     hip flexion 5/5 bilateral       knee extension 5/5 bilateral       ankle dorsiflexion 5/5 bilateral       ankle plantarflexion 5/5 bilateral       dorsiflexion of the great toe 5/5 bilateral       able to heel and toe walk     Reflexes:     patellar (L3, L4) symmetric normal       achilles tendons (S1) symmetric normal     Sensation:    grossly intact throughout lower extremities     Special tests:      straight leg raise left negative        straight leg raise right negative       neg (-) CARLTON  bilateral       neg (-) FADIR  bilateral     Radiology  I ordered, visualized and reviewed these images with the patient  2 XR views of lumbar spine reviewed: degenerative change at L5-S1 level  - will follow official read     I visualized and reviewed these images with the patient  MRI LUMBAR SPINE WITHOUT AND WITH CONTRAST   2/6/2019 8:31 PM   HISTORY: Chronic right-sided low back pain with left-sided sciatica.   TECHNIQUE: Multiplanar, multisequence MRI of the lumbar spine without  and with 10 mL Gadavist.   COMPARISON: Lumbar spine MR 3/18/2015, 5/7/2013, and 12/15/2009.   FINDINGS: There are 5 lumbar-type vertebral bodies assumed for the  purposes of this dictation. The distal spinal cord and cauda equina  appear normal  without abnormal enhancement.   Alignment of the lumbar spine is normal. No loss of vertebral body  height. There are no destructive osseous lesions. Marked loss of  intervertebral disc height with disc desiccation and Modic type  degenerative endplate changes at L5-S1. There is STIR hyperintense  endplate marrow edema at L5-S1 asymmetrically increased in the right.  Mild loss of disc height also at L3-4 and L4-5.   Level by level as follows:   T12-L1: No significant spinal canal or neural foraminal narrowing.   L1-L2: No significant spinal canal or neural foraminal narrowing.   L2-L3: No significant spinal canal or neural foraminal narrowing.   L3-L4: Mild posterior disc bulge results in mild bilateral neural  foraminal narrowing. No spinal canal narrowing.   L4-L5: Mild posterior disc bulge results in mild to moderate bilateral  neural foraminal narrowing. No significant spinal canal narrowing.   L5-S1: Postoperative changes of left-sided laminectomy and previous  discectomy. Again seen is mild circumferential disc bulge and endplate  osteophytic spurring with superimposed left subarticular disc  protrusion. Disc material abuts but does not significantly displace  the left S1 nerve root in the lateral recess. There is mild bilateral  neural foraminal narrowing. No significant spinal canal narrowing.  Paraspinous soft tissues: Normal.                                                            IMPRESSION:    1. Postoperative changes of left-sided laminectomy and discectomy at  L5-S1 again seen. Slight worsening of circumferential disc bulge and  endplate osteophytic spurring at this level. Persistent superimposed  left subarticular disc protrusion without significant compression of  the vascular structures. Mild bilateral neural foraminal narrowing is  not significantly changed. Endplate marrow edema at L5-S1 has slightly  increased since prior.  2. Mild degenerative disc changes at L3-4 and L4-5 without  significant  change since prior.     Assessment:  1. Chronic bilateral low back pain without sciatica    2. Lumbar degenerative disc disease    3. Mechanical low back pain    4. Lipoma of torso       Chronic low back pain with evidence of degenerative changes and mild disc herniation on MRI.  Overall reassuring exam currently with improving pain.  We discussed physical therapy for core strengthening, patient may wait until he has surgery on his thoracic lipoma.  We also discussed potential for further treatments if symptoms worsen.        Plan:  - Today's Plan of Care:  Monitor symptoms     -We also discussed other future treatment options:  Referral to spine surgeon, Rehab: Physical Therapy or Steroid injection of lower back     Follow Up: as needed     Concerning signs and symptoms were reviewed.  The patient expressed understanding of this management plan and all questions were answered at this time.     Thanks for the opportunity to participate in the care of this patient, I will keep you updated on their progress.     CC: Ari Daugherty MD [facility]  Primary Care Sports Medicine  [facility]     Again, thank you for allowing me to participate in the care of your patient.          Sincerely,           Mei Daugherty MD

## 2019-02-12 NOTE — PATIENT INSTRUCTIONS
Patient is here to go over the MRI.  The MRI is not showing anything obvious.  And I think that if we had a comparison of both sides it would show what is causing this left scapula to stick out so far.    We discussed the findings and I did review this with another surgeon who did not see any obvious mass or lipoma either.   I think for cost effectiveness a ct scan of the chest to see both sides less expensively will give a good idea of what is causing the left scapula to stick out further.  If it is all muscle and no tumor as the MRI would suggest then orthopedics may be his best option.  And would be nice to do a less expensive imaging study like a ct scan of the chest.     Please call Leonora's number at  if getting worse.        Ari Armando MD        Study Result     MRI LEFT SCAPULA WITHOUT AND WITH CONTRAST  2/6/2019 8:33 PM     HISTORY: Mass in the left scapula needs to be seen well. Benign  lipomatous neoplasm of skin and subcutaneous tissue of trunk.     COMPARISON: None.     TECHNIQUE: Multiplanar MR imaging is performed through the left  posterior upper chest before and after the uneventful intravenous  administration of 10 mL Gadavist contrast. Markers were placed over  the posterior chest wall at the sites of clinical concern. One is  posterior and superior to the scapula, and the second is medial to the  inferior scapula.     FINDINGS: No fracture or osseous lesion is seen. No abnormal marrow  signal intensity is identified. No abnormal soft tissue mass or fluid  collection is seen. No abnormal signal intensity or contrast  enhancement is identified.                                                                       IMPRESSION: Unremarkable MRI of the left posterior chest wall.  Specifically, no abnormal mass is identified.     ANTONY ENCISO MD

## 2019-09-17 ENCOUNTER — TELEPHONE (OUTPATIENT)
Dept: FAMILY MEDICINE | Facility: CLINIC | Age: 44
End: 2019-09-17

## 2019-09-17 ENCOUNTER — ANCILLARY PROCEDURE (OUTPATIENT)
Dept: GENERAL RADIOLOGY | Facility: CLINIC | Age: 44
End: 2019-09-17
Attending: FAMILY MEDICINE
Payer: COMMERCIAL

## 2019-09-17 ENCOUNTER — OFFICE VISIT (OUTPATIENT)
Dept: FAMILY MEDICINE | Facility: CLINIC | Age: 44
End: 2019-09-17
Payer: COMMERCIAL

## 2019-09-17 VITALS
DIASTOLIC BLOOD PRESSURE: 74 MMHG | SYSTOLIC BLOOD PRESSURE: 119 MMHG | WEIGHT: 221 LBS | HEART RATE: 97 BPM | TEMPERATURE: 98.4 F | OXYGEN SATURATION: 95 % | BODY MASS INDEX: 29.97 KG/M2

## 2019-09-17 DIAGNOSIS — R05.9 COUGH: Primary | ICD-10-CM

## 2019-09-17 DIAGNOSIS — R50.9 FEBRILE ILLNESS: ICD-10-CM

## 2019-09-17 DIAGNOSIS — J18.9 COMMUNITY ACQUIRED PNEUMONIA OF RIGHT MIDDLE LOBE OF LUNG: Primary | ICD-10-CM

## 2019-09-17 DIAGNOSIS — R05.9 COUGH: ICD-10-CM

## 2019-09-17 LAB
BASOPHILS # BLD AUTO: 0 10E9/L (ref 0–0.2)
BASOPHILS NFR BLD AUTO: 0.2 %
DIFFERENTIAL METHOD BLD: NORMAL
EOSINOPHIL # BLD AUTO: 0 10E9/L (ref 0–0.7)
EOSINOPHIL NFR BLD AUTO: 0.4 %
ERYTHROCYTE [DISTWIDTH] IN BLOOD BY AUTOMATED COUNT: 12.9 % (ref 10–15)
HCT VFR BLD AUTO: 41.9 % (ref 40–53)
HGB BLD-MCNC: 14.7 G/DL (ref 13.3–17.7)
LYMPHOCYTES # BLD AUTO: 0.9 10E9/L (ref 0.8–5.3)
LYMPHOCYTES NFR BLD AUTO: 15.8 %
MCH RBC QN AUTO: 30.8 PG (ref 26.5–33)
MCHC RBC AUTO-ENTMCNC: 35.1 G/DL (ref 31.5–36.5)
MCV RBC AUTO: 88 FL (ref 78–100)
MONOCYTES # BLD AUTO: 0.7 10E9/L (ref 0–1.3)
MONOCYTES NFR BLD AUTO: 12.1 %
NEUTROPHILS # BLD AUTO: 3.9 10E9/L (ref 1.6–8.3)
NEUTROPHILS NFR BLD AUTO: 71.5 %
PLATELET # BLD AUTO: 179 10E9/L (ref 150–450)
RBC # BLD AUTO: 4.77 10E12/L (ref 4.4–5.9)
WBC # BLD AUTO: 5.4 10E9/L (ref 4–11)

## 2019-09-17 PROCEDURE — 85025 COMPLETE CBC W/AUTO DIFF WBC: CPT | Performed by: FAMILY MEDICINE

## 2019-09-17 PROCEDURE — 36415 COLL VENOUS BLD VENIPUNCTURE: CPT | Performed by: FAMILY MEDICINE

## 2019-09-17 PROCEDURE — 71046 X-RAY EXAM CHEST 2 VIEWS: CPT

## 2019-09-17 PROCEDURE — 99214 OFFICE O/P EST MOD 30 MIN: CPT | Performed by: FAMILY MEDICINE

## 2019-09-17 RX ORDER — AZITHROMYCIN 250 MG/1
TABLET, FILM COATED ORAL
Qty: 6 TABLET | Refills: 1 | Status: SHIPPED | OUTPATIENT
Start: 2019-09-17 | End: 2019-10-18

## 2019-09-17 NOTE — PROGRESS NOTES
Subjective     Eleazar Herrera is a 44 year old male who presents to clinic today for the following health issues:    HPI     Acute Illness   Acute illness concerns: Fever  Onset: x 3 days    Fever: YES- 102 - 104    Chills/Sweats: YES    Headache (location?): YES    Sinus Pressure:no    Conjunctivitis:  no    Ear Pain: no    Rhinorrhea: no    Congestion: YES- Little bit    Sore Throat: no      Cough: YES- Little productive of gray sputum    Wheeze: no     Decreased Appetite: YES    Nausea: no    Vomiting: no    Diarrhea:  no    Dysuria/Freq.: no    Fatigue/Achiness: YES- Achiness    Sick/Strep Exposure: YES- Possible son     Therapies Tried and outcome: Advil helped with his temp    Past Medical History:   Diagnosis Date     Change in hearing 9/21/2018     Family history of Parkinson's disease 11/1/2011     Lumbago 11/1/2011     Snores 9/21/2018     Tremor      Tremor 10/31/2012       Past Surgical History:   Procedure Laterality Date     DISCECTOMY LUMBAR POSTERIOR MICROSCOPIC ONE LEVEL  5/23/2013    Procedure: DISCECTOMY LUMBAR POSTERIOR MICROSCOPIC ONE LEVEL;  Left Lumbar 5-Sacral 1 Microdiscectomy ;  Surgeon: James Curran MD;  Location: [facility] OR     epidural injection      back problem     SINUS SURGERY       TONSILLECTOMY         Family History   Problem Relation Age of Onset     Parkinsonism Mother      Restless Leg Syndrome Father      Parkinsonism Maternal Grandfather      Diabetes Sister         type i     C.A.D. No family hx of      Cancer No family hx of        Social History     Tobacco Use     Smoking status: Never Smoker     Smokeless tobacco: Never Used   Substance Use Topics     Alcohol use: Yes     Comment: OCCASSIONALLY       Reviewed and updated as needed this visit by Provider         Review of Systems   ROS COMP: Constitutional, HEENT, cardiovascular, pulmonary, GI, , musculoskeletal, neuro, skin, endocrine and psych systems are negative, except as otherwise noted.       Objective    There were no vitals taken for this visit.  There is no height or weight on file to calculate BMI.  Physical Exam   GENERAL: healthy, alert and no distress  EYES: Eyes grossly normal to inspection, PERRL and conjunctivae and sclerae normal  HENT: ear canals and TM's normal, nose and mouth without ulcers or lesions  NECK: no adenopathy, no asymmetry, masses, or scars and thyroid normal to palpation  RESP: lungs clear to auscultation - no rales, rhonchi or wheezes  CV: regular rate and rhythm, normal S1 S2, no S3 or S4, no murmur, click or rub, no peripheral edema and peripheral pulses strong  ABDOMEN: soft, nontender, no hepatosplenomegaly, no masses and bowel sounds normal  MS: no gross musculoskeletal defects noted, no edema  SKIN: no suspicious lesions or rashes  NEURO: Normal strength and tone, mentation intact and speech normal  PSYCH: mentation appears normal, affect normal/bright      Results for orders placed or performed in visit on 09/17/19   CBC with platelets differential   Result Value Ref Range    WBC 5.4 4.0 - 11.0 10e9/L    RBC Count 4.77 4.4 - 5.9 10e12/L    Hemoglobin 14.7 13.3 - 17.7 g/dL    Hematocrit 41.9 40.0 - 53.0 %    MCV 88 78 - 100 fl    MCH 30.8 26.5 - 33.0 pg    MCHC 35.1 31.5 - 36.5 g/dL    RDW 12.9 10.0 - 15.0 %    Platelet Count 179 150 - 450 10e9/L    % Neutrophils 71.5 %    % Lymphocytes 15.8 %    % Monocytes 12.1 %    % Eosinophils 0.4 %    % Basophils 0.2 %    Absolute Neutrophil 3.9 1.6 - 8.3 10e9/L    Absolute Lymphocytes 0.9 0.8 - 5.3 10e9/L    Absolute Monocytes 0.7 0.0 - 1.3 10e9/L    Absolute Eosinophils 0.0 0.0 - 0.7 10e9/L    Absolute Basophils 0.0 0.0 - 0.2 10e9/L    Diff Method Automated Method      Initial xray review by me appears normal, await radiology over read       ICD-10-CM    1. Cough R05 XR Chest 2 Views   2. Febrile illness R50.9 XR Chest 2 Views     Labs and x-ray appear normal.  We will wait for radiology over read and if shows an abnormality  we will start patient on antibiotics.  Otherwise recheck 48 hours if symptoms continue.

## 2019-09-17 NOTE — TELEPHONE ENCOUNTER
I spoke with patient   Pneumonia on right side of lung   Fits clinically   Start on z pack with refill x1   Call if not better by Monday.  Go to ER if worsens

## 2019-09-17 NOTE — RESULT ENCOUNTER NOTE
Your complete blood count was normal for white count to rule out infection and hemoglobin and platelets which were also normal.

## 2019-09-17 NOTE — RESULT ENCOUNTER NOTE
I will call over an antibiotic for you.  The radiologist did see something that looked like pneumonia

## 2019-09-29 ENCOUNTER — TRANSFERRED RECORDS (OUTPATIENT)
Dept: HEALTH INFORMATION MANAGEMENT | Facility: CLINIC | Age: 44
End: 2019-09-29

## 2019-10-03 ENCOUNTER — HEALTH MAINTENANCE LETTER (OUTPATIENT)
Age: 44
End: 2019-10-03

## 2019-10-10 ENCOUNTER — TRANSFERRED RECORDS (OUTPATIENT)
Dept: HEALTH INFORMATION MANAGEMENT | Facility: CLINIC | Age: 44
End: 2019-10-10

## 2019-10-18 ENCOUNTER — ANCILLARY PROCEDURE (OUTPATIENT)
Dept: GENERAL RADIOLOGY | Facility: CLINIC | Age: 44
End: 2019-10-18
Attending: FAMILY MEDICINE
Payer: COMMERCIAL

## 2019-10-18 ENCOUNTER — OFFICE VISIT (OUTPATIENT)
Dept: FAMILY MEDICINE | Facility: CLINIC | Age: 44
End: 2019-10-18
Payer: COMMERCIAL

## 2019-10-18 VITALS
DIASTOLIC BLOOD PRESSURE: 76 MMHG | SYSTOLIC BLOOD PRESSURE: 128 MMHG | HEIGHT: 72 IN | OXYGEN SATURATION: 97 % | BODY MASS INDEX: 29.93 KG/M2 | HEART RATE: 74 BPM | TEMPERATURE: 98.5 F | WEIGHT: 221 LBS

## 2019-10-18 DIAGNOSIS — J18.9 PNEUMONIA DUE TO INFECTIOUS ORGANISM, UNSPECIFIED LATERALITY, UNSPECIFIED PART OF LUNG: ICD-10-CM

## 2019-10-18 DIAGNOSIS — J18.9 PNEUMONIA DUE TO INFECTIOUS ORGANISM, UNSPECIFIED LATERALITY, UNSPECIFIED PART OF LUNG: Primary | ICD-10-CM

## 2019-10-18 PROCEDURE — 71046 X-RAY EXAM CHEST 2 VIEWS: CPT | Mod: FY

## 2019-10-18 PROCEDURE — 99214 OFFICE O/P EST MOD 30 MIN: CPT | Performed by: FAMILY MEDICINE

## 2019-10-18 RX ORDER — PREDNISONE 20 MG/1
20 TABLET ORAL DAILY
Qty: 5 TABLET | Refills: 0 | Status: SHIPPED | OUTPATIENT
Start: 2019-10-18 | End: 2019-10-23

## 2019-10-18 ASSESSMENT — MIFFLIN-ST. JEOR: SCORE: 1935.45

## 2019-10-18 NOTE — PATIENT INSTRUCTIONS
[facility]     Discharged by : Gayla Mckenna CMA    If you have any questions regarding your visit please contact your care team:     Team Maribel              Clinic Hours Telephone Number     Dr. Ariel Bhandari, CNP   7am-7pm  Monday - Thursday   7am-5pm  Fridays  [phone]   (Appointment scheduling available 24/7)     RN Line  [phone] option 2     Urgent Care - [place] and [place] [facility] - 11am-9pm Monday-Friday Saturday-Sunday- 9am-5pm     [place] -   5pm-9pm Monday-Friday Saturday-Sunday- 9am-5pm    [phone] - [place]    [phone] - [place]     For a Price Quote for your services, please call our [employer] Price Line at [phone].     What options do I have for visits at the clinic other than the traditional office visit?     To expand how we care for you, many of our providers are utilizing electronic visits (e-visits) and telephone visits, when medically appropriate, for interactions with their patients rather than a visit in the clinic. We also offer nurse visits for many medical concerns. Just like any other service, we will bill your insurance company for this type of visit based on time spent on the phone with your provider. Not all insurance companies cover these visits. Please check with your medical insurance if this type of visit is covered. You will be responsible for any charges that are not paid by your insurance.     E-visits via [employer]: generally incur a $45.00 fee.     Telephone visits:  Time spent on the phone: *charged based on time that is spent on the phone in increments of 10 minutes. Estimated cost:   5-10 mins $30.00   11-20 mins. $59.00   21-30 mins. $85.00       Use [employer] (secure email communication and access to your chart) to send your primary care provider a message or make an appointment. Ask someone on your Team how to sign up for [employer].     As always, Thank you for trusting  us with your health care needs!      [facility]:    Scheduling Appointments  Duane Aguiar [facility]  Call: [phone]    [place]  Call: [phone]    [facility]  Call: [phone]    For Gastroenterology referrals   [facility], 4th Floor   [address] [place] [address]   Appointments: [phone]    WHERE TO GO FOR CARE?    Clinic    Make an appointment if you:       Are sick (cold, cough, flu, sore throat, earache or in pain).       Have a small injury (sprain, small cut, burn or broken bone).       Need a physical exam, Pap smear, vaccine or prescription refill.       Have questions about your health or medicines.    To reach us:      Call [phone] ([phone]). Open 24 hours every day. (For counseling services, call [phone].)    Log into Smartsheet at [employer]. (Visit [email or web address] to create an account.) Hospital emergency room    An emergency is a serious or life- threatening problem that must be treated right away.    Call 238 or get to the hospital if you have:      Very bad or sudden:            - Chest pain or pressure         - Bleeding         - Head or belly pain         - Dizziness or trouble seeing, walking or                          Speaking      Problems breathing      Blood in your vomit or you are coughing up blood      A major injury (knocked out, loss of a finger or limb, rape, broken bone protruding from skin)    A mental health crisis. (Or call the Mental Health Crisis line at 1-857.631.8687 or Suicide Prevention Hotline at 1-784.735.2391.)    Open 24 hours every day. You don't need an appointment.     Urgent care    Visit urgent care for sickness or small injuries when the clinic is closed. You don't need an appointment. To check hours or find an urgent care near you, visit [email or web address]. Online care    Get  online care from [facility] for more than 70 common problems, like colds, allergies and infections. Open 24 hours every day at:   [email or web address]   Need help deciding?    For advice about where to be seen, you may call your clinic and ask to speak with a nurse. We're here for you 24 hours every day.         If you are deaf or hard of hearing, please let us know. We provide many free services including sign language interpreters, oral interpreters, TTYs, telephone amplifiers, note takers and written materials.

## 2019-10-18 NOTE — PROGRESS NOTES
Subjective     Eleazar Herrera is a 44 year old male who presents to clinic today for the following health issues:    HPI     Got flu vaccine at work 09/10/2019    ED/UC Followup:    Facility:  [facility]   Date of visit: 09/29/2019  Reason for visit:   Current Status:  Was given Doxycyline and Benzonatate. Feels better, but still has problem  with sore throat, cough, chest pressure., little bit of wheezing. Went back to [facility] last week. Was recommended to do Xrays     Patient was initially seen at the [facility] on 9/17.  XR showed suspicion for PNA, so he was treated with Zpack.  Symptoms continued to worsen, so he presented at [facility] on 9/29 and was treated with doxycycline.  He is feeling better overall, however still has a lingering cough, chest pressure, and sore throat.  No fevers.        Patient Active Problem List   Diagnosis     Lumbago     Family history of Parkinson's disease     Hyperlipidemia LDL goal <130     Tremor     Lumbosacral disc herniation     Anxiety     Change in hearing     Snores     Sleep disorder     EMMA (obstructive sleep apnea)     Anxiety state     Lipoma     Past Surgical History:   Procedure Laterality Date     DISCECTOMY LUMBAR POSTERIOR MICROSCOPIC ONE LEVEL  5/23/2013    Procedure: DISCECTOMY LUMBAR POSTERIOR MICROSCOPIC ONE LEVEL;  Left Lumbar 5-Sacral 1 Microdiscectomy ;  Surgeon: James Curran MD;  [facility]     epidural injection      back problem     SINUS SURGERY       TONSILLECTOMY         Social History     Tobacco Use     Smoking status: Never Smoker     Smokeless tobacco: Never Used   Substance Use Topics     Alcohol use: Yes     Comment: 3drinks per day 21 per week     Family History   Problem Relation Age of Onset     Parkinsonism Mother      Restless Leg Syndrome Father      Parkinsonism Maternal Grandfather      Diabetes Sister         type i     C.A.D. No family hx of      Cancer No family hx of             Reviewed and updated as needed this visit by Provider         Review of Systems   ROS COMP: Constitutional, HEENT, cardiovascular, pulmonary, gi and gu systems are negative, except as otherwise noted.      Objective    /76   Pulse 74   Temp 98.5  F (36.9  C) (Oral)   Ht 1.829 m (6')   Wt 100.2 kg (221 lb)   SpO2 97%   BMI 29.97 kg/m    Body mass index is 29.97 kg/m .  Physical Exam   GENERAL: healthy, alert and no distress  HENT: ear canals and TM's normal, nose and mouth without ulcers or lesions  NECK: bilateral anterior cervical adenopathy  RESP: lungs clear to auscultation - no rales, rhonchi or wheezes  CV: regular rates and rhythm, normal S1 S2, no S3 or S4 and no murmur, click or rub    Diagnostic Test Results:  CHEST TWO VIEWS  10/18/2019 9:07 AM      HISTORY: 44-year-old patient with history of pneumonia.                                                                       IMPRESSION: Since September 17, 2019, heart size is normal. No pleural  effusion, pneumothorax, or abnormal area of consolidation.     NAY DAWSON MD        Assessment & Plan     1. Pneumonia due to infectious organism, unspecified laterality, unspecified part of lung  - XR appears improved when compared with 9/17 imaging and clinically pt is improving  - He continues to have residual cough and chest heaviness, so will treat with a short course of steroids   - XR Chest 2 Views; Future  - predniSONE (DELTASONE) 20 MG tablet; Take 1 tablet (20 mg) by mouth daily for 5 days  Dispense: 5 tablet; Refill: 0     Return in about 1 week (around 10/25/2019), or if symptoms worsen or fail to improve.    Effie Del Rosario DO  [facility]

## 2019-10-25 ENCOUNTER — ANCILLARY PROCEDURE (OUTPATIENT)
Dept: GENERAL RADIOLOGY | Facility: CLINIC | Age: 44
End: 2019-10-25
Attending: PHYSICIAN ASSISTANT
Payer: COMMERCIAL

## 2019-10-25 ENCOUNTER — TELEPHONE (OUTPATIENT)
Dept: FAMILY MEDICINE | Facility: CLINIC | Age: 44
End: 2019-10-25

## 2019-10-25 ENCOUNTER — OFFICE VISIT (OUTPATIENT)
Dept: URGENT CARE | Facility: URGENT CARE | Age: 44
End: 2019-10-25
Payer: COMMERCIAL

## 2019-10-25 VITALS
WEIGHT: 223.6 LBS | BODY MASS INDEX: 30.33 KG/M2 | HEART RATE: 81 BPM | DIASTOLIC BLOOD PRESSURE: 82 MMHG | RESPIRATION RATE: 24 BRPM | TEMPERATURE: 98.5 F | OXYGEN SATURATION: 96 % | SYSTOLIC BLOOD PRESSURE: 133 MMHG

## 2019-10-25 DIAGNOSIS — Z98.890 S/P SINUS SURGERY: ICD-10-CM

## 2019-10-25 DIAGNOSIS — R09.81 SINUS CONGESTION: ICD-10-CM

## 2019-10-25 DIAGNOSIS — R04.0 EPISTAXIS: ICD-10-CM

## 2019-10-25 DIAGNOSIS — J01.90 ACUTE SINUSITIS WITH SYMPTOMS > 10 DAYS: Primary | ICD-10-CM

## 2019-10-25 PROCEDURE — 99214 OFFICE O/P EST MOD 30 MIN: CPT | Performed by: PHYSICIAN ASSISTANT

## 2019-10-25 PROCEDURE — 70220 X-RAY EXAM OF SINUSES: CPT

## 2019-10-25 NOTE — TELEPHONE ENCOUNTER
Reason for call:  Patient reporting a symptom    Symptom or request: pneumonia    Duration (how long have symptoms been present): 1 week    Have you been treated for this before? Yes    Additional comments: Patient states he feels worse despite completing prescribed medications, wondering if he should be seen in clinic or at an urgent care.     Phone Number patient can be reached at:  [phone]  Best Time:  any    Can we leave a detailed message on this number:  YES    Call taken on 10/25/2019 at 11:56 AM by Linda Hernandez

## 2019-10-25 NOTE — TELEPHONE ENCOUNTER
Disposition:  To  now due to symptoms worsening after being treated for pneumonia.    Patient was first diagnosed with pneumonia on 9/15 and has two rounds of antibiotics and one round of prednisone.    He is now coughing up bloody phlegm and has pain in his face, jaw, and eyes.  Rates pain 3/10.  Chest heaviness and coughing continues.  Denies wheezing, SOB, and fever.      Per Dr. Del Rosario's OV note on 10/18, patient is to follow up in clinic around 10/25 if symptoms.  No appointments available at this time.     Patient agreed to go to  now.    Daryl Traore RN

## 2019-10-25 NOTE — PROGRESS NOTES
S: 45 yo male is here for persistent mainly right-sided sinus congestion since September.  He has been seen multiple times.  Was found to have pneumonia 2018.  Treated with Z-Joni and doxycycline.  Most recent chest x-ray showed resolution of the pneumonia.  Tried a 5-day course of prednisone without significant relief.  Today with blood in the phlegm at the back of his throat.  No longer has any fever.  No history of asthma.  No history of seasonal allergies.  No sneezing or watery itchy eyes.  Prior sinus surgery .    Treatment throughout this illness-   Z pack, Doxy, Tessalon Perles, CXR x 2, Prednisone      No Known Allergies    Past Medical History:   Diagnosis Date     Change in hearing 2018     Family history of Parkinson's disease 2011     Lumbago 2011     Snores 2018     Tremor      Tremor 10/31/2012       [] predniSONE (DELTASONE) 20 MG tablet, Take 1 tablet (20 mg) by mouth daily for 5 days  propranolol (INDERAL) 20 MG tablet, Take 1 tablet (20 mg) by mouth daily as needed (tremor) (Patient not taking: Reported on 10/5/2018)    No current facility-administered medications on file prior to visit.       Social History     Tobacco Use     Smoking status: Former Smoker     Packs/day: 0.00     Smokeless tobacco: Never Used   Substance Use Topics     Alcohol use: Yes     Comment: 3drinks per day 21 per week       ROS:  CONSTITUTIONAL: Negative for fatigue or fever.  EYES: Negative for eye problems.  ENT: As above.  RESP: As above.  CV: Negative for chest pains.  GI: Negative for vomiting.  MUSCULOSKELETAL:  Negative for significant muscle or joint pains.  NEUROLOGIC: Negative for headaches.  SKIN: Negative for rash.  PSYCH: Normal mentation for age.    OBJECTIVE:  /82 (BP Location: Left arm, Patient Position: Sitting, Cuff Size: Adult Large)   Pulse 81   Temp 98.5  F (36.9  C) (Oral)   Resp 24   Wt 101.4 kg (223 lb 9.6 oz)   SpO2 96%   BMI 30.33 kg/m    GENERAL  APPEARANCE: Healthy, alert and no distress.  EYES:Conjunctiva/sclera clear.  EARS: No cerumen.   Ear canals w/o erythema.  TM's intact w/o erythema.    NOSE/MOUTH: Nasal turbinates are red and inflamed.  I see where he had some nasal bleeding on the right side.    SINUSES: Moderate right frontal and maxillary sinus tenderness.  THROAT: No erythema w/o tonsillar enlargement . No exudates.  NECK: Supple, nontender, no lymphadenopathy.  RESP: Lungs clear to auscultation - no rales, rhonchi or wheezes  CV: Regular rate and rhythm, normal S1 S2, no murmur noted.  NEURO: Awake, alert    SKIN: No rashes    Study Result     SINUS THREE VIEWS COMPLETE  10/25/2019 2:04 PM     HISTORY: Sinus congestion     COMPARISON: None.                                                                      IMPRESSION: The maxillary sinuses are nearly completely opacified.  There is probably mucosal thickening in the frontal sinuses. No  air-fluid levels are identified.         ASSESSMENT:     ICD-10-CM    1. Acute sinusitis with symptoms > 10 days J01.90 amoxicillin-clavulanate (AUGMENTIN) 875-125 MG tablet     OTOLARYNGOLOGY REFERRAL   2. Sinus congestion R09.81 XR Sinus Complete G/E 3 Views     OTOLARYNGOLOGY REFERRAL   3. Epistaxis R04.0 OTOLARYNGOLOGY REFERRAL   4. S/P sinus surgery Z98.890 OTOLARYNGOLOGY REFERRAL           PLAN:Persistent sinus congestion. See ENT.  I have discussed clinical findings with patient.  Side effects of medications discussed.  Symptomatic care is discussed.  I have discussed the possibility of  worsening symptoms and to RTC or ER if they occur.  All questions are answered and patient is in agreement with plan.   Patient care instructions are given to at the end of visit.   Lots of rest and fluids.    Alyssa Costello PA-C

## 2019-10-31 ENCOUNTER — HEALTH MAINTENANCE LETTER (OUTPATIENT)
Age: 44
End: 2019-10-31

## 2019-11-13 ENCOUNTER — OFFICE VISIT (OUTPATIENT)
Dept: OTOLARYNGOLOGY | Facility: CLINIC | Age: 44
End: 2019-11-13
Payer: COMMERCIAL

## 2019-11-13 VITALS
HEART RATE: 85 BPM | HEIGHT: 72 IN | RESPIRATION RATE: 16 BRPM | BODY MASS INDEX: 30.2 KG/M2 | WEIGHT: 223 LBS | DIASTOLIC BLOOD PRESSURE: 79 MMHG | SYSTOLIC BLOOD PRESSURE: 128 MMHG | OXYGEN SATURATION: 96 %

## 2019-11-13 DIAGNOSIS — J34.89 NASAL OBSTRUCTION: ICD-10-CM

## 2019-11-13 DIAGNOSIS — H61.21 IMPACTED CERUMEN OF RIGHT EAR: ICD-10-CM

## 2019-11-13 DIAGNOSIS — J01.90 ACUTE SINUSITIS TREATED WITH ANTIBIOTICS IN THE PAST 60 DAYS: Primary | ICD-10-CM

## 2019-11-13 PROCEDURE — 69210 REMOVE IMPACTED EAR WAX UNI: CPT | Mod: RT | Performed by: OTOLARYNGOLOGY

## 2019-11-13 PROCEDURE — 99203 OFFICE O/P NEW LOW 30 MIN: CPT | Mod: 25 | Performed by: OTOLARYNGOLOGY

## 2019-11-13 ASSESSMENT — MIFFLIN-ST. JEOR: SCORE: 1944.52

## 2019-11-13 NOTE — LETTER
11/13/2019         RE: Eleazar Herrera  [address] [facility] [address]        Dear Colleague,    Thank you for referring your patient, Eleazar Herrera, to the [facility]. Please see a copy of my visit note below.    I am seeing this patient in consultation for acute sinusitis at the request of the provider Alyssa Costello.    Chief Complaint - sinusitis    History of Present Illness - Eleazar Herrera is a 44 year old male who presents for evaluation of acute sinusitis. The patient describes symptoms of cough, face pressure, teeth pain for at least two weeks, but symptoms lasted longer - about 4 weeks. Presently, the patient is much better, still mild cough. He has never had a sinus infection before this. Treatments have included Z-pack, Doxycycline and Augmentin, Tessalon Perles, and Prednisone. The treatments seem to have helped now. + prior history of septoplasty (2003). He feels he doesn't breath that great afterwards. Sinus x-ray on 10/25/19 showed maxillary sinus opacification and inflammation in the frontals. CXR in 9/17 showed likely right middle lobe pneumonia.     Past Medical History -   Patient Active Problem List   Diagnosis     Lumbago     Family history of Parkinson's disease     Hyperlipidemia LDL goal <130     Tremor     Lumbosacral disc herniation     Anxiety     Change in hearing     Snores     Sleep disorder     EMMA (obstructive sleep apnea)     Anxiety state     Lipoma       Current Medications -   Current Outpatient Medications:      amoxicillin-clavulanate (AUGMENTIN) 875-125 MG tablet, Take 1 tablet by mouth 2 times daily, Disp: 20 tablet, Rfl: 0     propranolol (INDERAL) 20 MG tablet, Take 1 tablet (20 mg) by mouth daily as needed (tremor) (Patient not taking: Reported on 10/5/2018), Disp: 90 tablet, Rfl: 3    Allergies - No Known Allergies    Social History -   Social History     Socioeconomic History     Marital status: Single     Spouse name: Not on  file     Number of children: 0     Years of education: Not on file     Highest education level: Not on file   Occupational History     Employer: [employer]   Social Needs     Financial resource strain: Not on file     Food insecurity:     Worry: Not on file     Inability: Not on file     Transportation needs:     Medical: Not on file     Non-medical: Not on file   Tobacco Use     Smoking status: Former Smoker     Packs/day: 0.00     Smokeless tobacco: Never Used   Substance and Sexual Activity     Alcohol use: Yes     Comment: 3drinks per day 21 per week     Drug use: No     Sexual activity: Yes     Partners: Female   Lifestyle     Physical activity:     Days per week: Not on file     Minutes per session: Not on file     Stress: Not on file   Relationships     Social connections:     Talks on phone: Not on file     Gets together: Not on file     Attends [employer] service: Not on file     Active member of club or organization: Not on file     Attends meetings of clubs or organizations: Not on file     Relationship status: Not on file     Intimate partner violence:     Fear of current or ex partner: Not on file     Emotionally abused: Not on file     Physically abused: Not on file     Forced sexual activity: Not on file   Other Topics Concern     Parent/sibling w/ CABG, MI or angioplasty before 65F 55M? 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 He is living in [facility]    Single    Has a house mate; who lives upstairs    No dependents.    Broke up with girl friend - after 6 yrs.    She had 2 kids: 12 yrs and 9 yrs    No smoking    Social alcohol.            Mother lives in [place], [place] -- near [place]    She has parkinson and is hallucinating on mirapex    She is not driving and was born in 1939 and is 73 yrs old.            Family history of parkinson in his  mother with onset of her symptoms in her late 60s and she is 73 yrs old now.        Maternal grandfather had parkinson as well and passed away in his 80s.        2015    He lives in [place] and he has a fiance who is pregnant at this time with their child.     She is a heads of [employer] for [employer]           Family History -   Family History   Problem Relation Age of Onset     Parkinsonism Mother      Restless Leg Syndrome Father      Parkinsonism Maternal Grandfather      Diabetes Sister         type i     C.A.D. No family hx of      Cancer No family hx of        Review of Systems - As per HPI and PMHx, otherwise 10+ comprehensive system review is negative.    Physical Exam  /79   Pulse 85   Resp 16   Ht 1.829 m (6')   Wt 101.2 kg (223 lb)   SpO2 96%   BMI 30.24 kg/m     General - The patient is nontoxic, in no distress. Alert and oriented to person and place, answers questions and cooperates with examination appropriately.   Neurologic - CN II-XII are intact. No focal neurologic deficits.   Voice and Breathing - The patient was breathing comfortably without the use of accessory muscles. There was no wheezing, stridor, or stertor.  The patients voice was clear and strong.  Eyes - Extraocular movements intact.  Sclera were not icteric or injected, conjunctiva were pink and moist.  Mouth - Examination of the oral cavity showed pink, healthy oral mucosa. No lesions or ulcerations noted.  The tongue was mobile and midline.  Throat - The walls of the oropharynx were smooth, symmetric, and had no lesions or ulcerations.  No postnasal drainage.  The uvula was midline on elevation.  Ears - Bilateral pinna and EACs with normal appearing overlying skin. Right cerumen impaction. I laid the patient supine and used the otic microscope. I used an alligator to grasp cerumen in the medial canal. It had white fibers from q-tips. I removed all the cerumen. I could then see the tympanic membrane. Tympanic membranes  intact bilaterally. Bony landmarks of the ossicular chain are normal. No retraction, perforation, or masses.  No fluid or purulence was seen in the external canal or the middle ear.   Nose - External contour is symmetric, no gross deflection or scars.  Nasal mucosa is pink and moist with no abnormal mucus.  The septum was deviated some to the left along the floor and to the right along the mid septum. Turbinates hypertrophic.  No polyps, masses, or purulence noted on examination.  Neck - Palpation of the occipital, submental, submandibular, internal jugular chain, and supraclavicular nodes did not demonstrate any abnormal lymph nodes or masses. No parotid masses. Palpation of the thyroid was soft and smooth, with no nodules or goiter appreciated.  The trachea was mobile and midline.  Cardiovascular - carotid pulses are 2+ bilaterally, regular rhythm        A/P - Eleazar Herrera is a 44 year old male with a bout of acute sinusitis.  This was a protracted course and required 3 courses of antibiotics.  However now he seems back to normal.  I do not think this needs further work-up.  If things returned and he has numerous bouts of acute sinusitis he should return.  We could consider imaging at that point.  He does have nasal obstruction secondary to septal deviation.  He had a septoplasty at one point but looks like things have shifted or were not totally corrected.  If he wants to pursue this we can discuss it more.  I would encourage turbinate reduction at the same time to help improve the chance of outcome.  Lastly had right ear cerumen impaction this is clean.      Nicko Main MD  Otolaryngology  [facility]      Again, thank you for allowing me to participate in the care of your patient.        Sincerely,        Nicko Main MD

## 2019-11-13 NOTE — PROGRESS NOTES
I am seeing this patient in consultation for acute sinusitis at the request of the provider Alyssa Costello.    Chief Complaint - sinusitis    History of Present Illness - Eleazar Herrera is a 44 year old male who presents for evaluation of acute sinusitis. The patient describes symptoms of cough, face pressure, teeth pain for at least two weeks, but symptoms lasted longer - about 4 weeks. Presently, the patient is much better, still mild cough. He has never had a sinus infection before this. Treatments have included Z-pack, Doxycycline and Augmentin, Tessalon Perles, and Prednisone. The treatments seem to have helped now. + prior history of septoplasty (2003). He feels he doesn't breath that great afterwards. Sinus x-ray on 10/25/19 showed maxillary sinus opacification and inflammation in the frontals. CXR in 9/17 showed likely right middle lobe pneumonia.     Past Medical History -   Patient Active Problem List   Diagnosis     Lumbago     Family history of Parkinson's disease     Hyperlipidemia LDL goal <130     Tremor     Lumbosacral disc herniation     Anxiety     Change in hearing     Snores     Sleep disorder     EMMA (obstructive sleep apnea)     Anxiety state     Lipoma       Current Medications -   Current Outpatient Medications:      amoxicillin-clavulanate (AUGMENTIN) 875-125 MG tablet, Take 1 tablet by mouth 2 times daily, Disp: 20 tablet, Rfl: 0     propranolol (INDERAL) 20 MG tablet, Take 1 tablet (20 mg) by mouth daily as needed (tremor) (Patient not taking: Reported on 10/5/2018), Disp: 90 tablet, Rfl: 3    Allergies - No Known Allergies    Social History -   Social History     Socioeconomic History     Marital status: Single     Spouse name: Not on file     Number of children: 0     Years of education: Not on file     Highest education level: Not on file   Occupational History     Employer: [employer]   Social Needs     Financial resource strain: Not on file     Food insecurity:      Worry: Not on file     Inability: Not on file     Transportation needs:     Medical: Not on file     Non-medical: Not on file   Tobacco Use     Smoking status: Former Smoker     Packs/day: 0.00     Smokeless tobacco: Never Used   Substance and Sexual Activity     Alcohol use: Yes     Comment: 3drinks per day 21 per week     Drug use: No     Sexual activity: Yes     Partners: Female   Lifestyle     Physical activity:     Days per week: Not on file     Minutes per session: Not on file     Stress: Not on file   Relationships     Social connections:     Talks on phone: Not on file     Gets together: Not on file     Attends [employer] service: Not on file     Active member of club or organization: Not on file     Attends meetings of clubs or organizations: Not on file     Relationship status: Not on file     Intimate partner violence:     Fear of current or ex partner: Not on file     Emotionally abused: Not on file     Physically abused: Not on file     Forced sexual activity: Not on file   Other Topics Concern     Parent/sibling w/ CABG, MI or angioplasty before 65F 55M? 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 He is living in [facility]    Single    Has a house mate; who lives upstairs    No dependents.    Broke up with girl friend - after 6 yrs.    She had 2 kids: 12 yrs and 9 yrs    No smoking    Social alcohol.            Mother lives in [place], [place] -- near [place]    She has parkinson and is hallucinating on mirapex    She is not driving and was born in 1939 and is 73 yrs old.            Family history of parkinson in his mother with onset of her symptoms in her late 60s and she is 73 yrs old now.        Maternal grandfather had parkinson as well and passed away in his 80s.        2015    He lives in [place] and he has a fiance who is pregnant at this time with their child.      She is a heads of [employer] for [employer]           Family History -   Family History   Problem Relation Age of Onset     Parkinsonism Mother      Restless Leg Syndrome Father      Parkinsonism Maternal Grandfather      Diabetes Sister         type i     C.A.D. No family hx of      Cancer No family hx of        Review of Systems - As per HPI and PMHx, otherwise 10+ comprehensive system review is negative.    Physical Exam  /79   Pulse 85   Resp 16   Ht 1.829 m (6')   Wt 101.2 kg (223 lb)   SpO2 96%   BMI 30.24 kg/m    General - The patient is nontoxic, in no distress. Alert and oriented to person and place, answers questions and cooperates with examination appropriately.   Neurologic - CN II-XII are intact. No focal neurologic deficits.   Voice and Breathing - The patient was breathing comfortably without the use of accessory muscles. There was no wheezing, stridor, or stertor.  The patients voice was clear and strong.  Eyes - Extraocular movements intact.  Sclera were not icteric or injected, conjunctiva were pink and moist.  Mouth - Examination of the oral cavity showed pink, healthy oral mucosa. No lesions or ulcerations noted.  The tongue was mobile and midline.  Throat - The walls of the oropharynx were smooth, symmetric, and had no lesions or ulcerations.  No postnasal drainage.  The uvula was midline on elevation.  Ears - Bilateral pinna and EACs with normal appearing overlying skin. Right cerumen impaction. I laid the patient supine and used the otic microscope. I used an alligator to grasp cerumen in the medial canal. It had white fibers from q-tips. I removed all the cerumen. I could then see the tympanic membrane. Tympanic membranes intact bilaterally. Bony landmarks of the ossicular chain are normal. No retraction, perforation, or masses.  No fluid or purulence was seen in the external canal or the middle ear.   Nose - External contour is symmetric, no gross deflection or scars.  Nasal  mucosa is pink and moist with no abnormal mucus.  The septum was deviated some to the left along the floor and to the right along the mid septum. Turbinates hypertrophic.  No polyps, masses, or purulence noted on examination.  Neck - Palpation of the occipital, submental, submandibular, internal jugular chain, and supraclavicular nodes did not demonstrate any abnormal lymph nodes or masses. No parotid masses. Palpation of the thyroid was soft and smooth, with no nodules or goiter appreciated.  The trachea was mobile and midline.  Cardiovascular - carotid pulses are 2+ bilaterally, regular rhythm        A/P - Eleazar Herrera is a 44 year old male with a bout of acute sinusitis.  This was a protracted course and required 3 courses of antibiotics.  However now he seems back to normal.  I do not think this needs further work-up.  If things returned and he has numerous bouts of acute sinusitis he should return.  We could consider imaging at that point.  He does have nasal obstruction secondary to septal deviation.  He had a septoplasty at one point but looks like things have shifted or were not totally corrected.  If he wants to pursue this we can discuss it more.  I would encourage turbinate reduction at the same time to help improve the chance of outcome.  Lastly had right ear cerumen impaction this is clean.      Nicko Main MD  Otolaryngology  [facility]

## 2019-11-13 NOTE — PATIENT INSTRUCTIONS
General Scheduling Information  To schedule your CT/MRI scan, please contact [place] [facility] at [phone] [address] [place] [address]    To schedule your Surgery, please contact our Specialty Schedulers at [phone]    ENT Clinic Locations Clinic Hours Telephone Number     [facility]ial[facility] Quezada  [address] [place] [address]   Tuesday:       8:00am -- 4:00pm    Wednesday:  8:00am - 4:00pm   To schedule an appointment with   Dr. Main,   please contact our   Specialty Scheduling Department at:     [phone]       [facility] Garces  [address] [place] [address]   Friday:          8:00am - 4:00pm         Urgent Care Locations Clinic Hours Telephone Numbers     Diallo Ramirez  [address] [place] [address]     Monday-Friday:     11:00pm - 9:00pm    Saturday-Sunday:  9:00am - 5:00pm   [phone]     Dial[facility] Garces  [address] [place] [address]     Monday-Friday:      5:00pm - 9:00pm     Saturday-Sunday:  9:00am - 5:00pm   [phone]

## 2020-11-01 ENCOUNTER — TRANSFERRED RECORDS (OUTPATIENT)
Dept: HEALTH INFORMATION MANAGEMENT | Facility: CLINIC | Age: 45
End: 2020-11-01

## 2020-11-07 ENCOUNTER — HEALTH MAINTENANCE LETTER (OUTPATIENT)
Age: 45
End: 2020-11-07

## 2020-12-11 ENCOUNTER — OFFICE VISIT (OUTPATIENT)
Dept: FAMILY MEDICINE | Facility: CLINIC | Age: 45
End: 2020-12-11
Payer: COMMERCIAL

## 2020-12-11 VITALS
BODY MASS INDEX: 32.06 KG/M2 | TEMPERATURE: 97.9 F | HEART RATE: 68 BPM | DIASTOLIC BLOOD PRESSURE: 81 MMHG | WEIGHT: 229 LBS | SYSTOLIC BLOOD PRESSURE: 127 MMHG | HEIGHT: 71 IN

## 2020-12-11 DIAGNOSIS — E78.5 HYPERLIPIDEMIA LDL GOAL <100: ICD-10-CM

## 2020-12-11 DIAGNOSIS — Z00.00 ROUTINE GENERAL MEDICAL EXAMINATION AT A HEALTH CARE FACILITY: Primary | ICD-10-CM

## 2020-12-11 DIAGNOSIS — E55.9 VITAMIN D DEFICIENCY DISEASE: ICD-10-CM

## 2020-12-11 DIAGNOSIS — Z13.1 SCREENING FOR DIABETES MELLITUS: ICD-10-CM

## 2020-12-11 DIAGNOSIS — R53.83 FATIGUE, UNSPECIFIED TYPE: ICD-10-CM

## 2020-12-11 DIAGNOSIS — Z12.5 SCREENING FOR PROSTATE CANCER: ICD-10-CM

## 2020-12-11 DIAGNOSIS — Z23 NEED FOR TD VACCINE: ICD-10-CM

## 2020-12-11 PROCEDURE — 99396 PREV VISIT EST AGE 40-64: CPT | Mod: 25 | Performed by: FAMILY MEDICINE

## 2020-12-11 PROCEDURE — 90471 IMMUNIZATION ADMIN: CPT | Performed by: FAMILY MEDICINE

## 2020-12-11 PROCEDURE — 90714 TD VACC NO PRESV 7 YRS+ IM: CPT | Performed by: FAMILY MEDICINE

## 2020-12-11 SDOH — ECONOMIC STABILITY: FOOD INSECURITY: WITHIN THE PAST 12 MONTHS, YOU WORRIED THAT YOUR FOOD WOULD RUN OUT BEFORE YOU GOT MONEY TO BUY MORE.: NOT ASKED

## 2020-12-11 SDOH — ECONOMIC STABILITY: FOOD INSECURITY: WITHIN THE PAST 12 MONTHS, THE FOOD YOU BOUGHT JUST DIDN'T LAST AND YOU DIDN'T HAVE MONEY TO GET MORE.: NOT ASKED

## 2020-12-11 SDOH — ECONOMIC STABILITY: TRANSPORTATION INSECURITY
IN THE PAST 12 MONTHS, HAS LACK OF TRANSPORTATION KEPT YOU FROM MEETINGS, WORK, OR FROM GETTING THINGS NEEDED FOR DAILY LIVING?: NOT ASKED

## 2020-12-11 SDOH — ECONOMIC STABILITY: TRANSPORTATION INSECURITY
IN THE PAST 12 MONTHS, HAS THE LACK OF TRANSPORTATION KEPT YOU FROM MEDICAL APPOINTMENTS OR FROM GETTING MEDICATIONS?: NOT ASKED

## 2020-12-11 SDOH — ECONOMIC STABILITY: INCOME INSECURITY: HOW HARD IS IT FOR YOU TO PAY FOR THE VERY BASICS LIKE FOOD, HOUSING, MEDICAL CARE, AND HEATING?: NOT ASKED

## 2020-12-11 ASSESSMENT — ENCOUNTER SYMPTOMS
FREQUENCY: 0
EYE PAIN: 0
MYALGIAS: 0
CHILLS: 0
SHORTNESS OF BREATH: 0
NAUSEA: 0
HEMATOCHEZIA: 0
ARTHRALGIAS: 0
COUGH: 0
NERVOUS/ANXIOUS: 1
HEARTBURN: 0
DYSURIA: 0
ABDOMINAL PAIN: 0
HEADACHES: 0
HEMATURIA: 0
WEAKNESS: 0
SORE THROAT: 0
DIZZINESS: 0
PALPITATIONS: 0
PARESTHESIAS: 0
DIARRHEA: 0
JOINT SWELLING: 0
FEVER: 0
CONSTIPATION: 0

## 2020-12-11 ASSESSMENT — MIFFLIN-ST. JEOR: SCORE: 1954.99

## 2020-12-11 ASSESSMENT — PAIN SCALES - GENERAL: PAINLEVEL: NO PAIN (0)

## 2020-12-11 NOTE — PATIENT INSTRUCTIONS
Decrease alcohol use    Keep working on healthy diet/exercise and wt loss    Fasting lab appointment

## 2020-12-11 NOTE — PROGRESS NOTES
SUBJECTIVE:   CC: Eleazar Herrera is an 45 year old male who presents for preventative health visit.        Patient has been advised of split billing requirements and indicates understanding: Yes  Healthy Habits:     Getting at least 3 servings of Calcium per day:  Yes    Bi-annual eye exam:  Yes    Dental care twice a year:  NO    Sleep apnea or symptoms of sleep apnea:  Excessive snoring    Diet:  Regular (no restrictions)    Frequency of exercise:  4-5 days/week    Duration of exercise:  30-45 minutes    Taking medications regularly:  Yes    Medication side effects:  Not applicable    PHQ-2 Total Score: 0    Additional concerns today:  No               Today's PHQ-2 Score:   PHQ-2 ( 1999 [employer]) 12/11/2020   Q1: Little interest or pleasure in doing things 0   Q2: Feeling down, depressed or hopeless 0   PHQ-2 Score 0   Q1: Little interest or pleasure in doing things Not at all   Q2: Feeling down, depressed or hopeless Not at all   PHQ-2 Score 0       Abuse: Current or Past(Physical, Sexual or Emotional)- No  Do you feel safe in your environment? Yes        Social History     Tobacco Use     Smoking status: Former Smoker     Packs/day: 0.00     Smokeless tobacco: Never Used   Substance Use Topics     Alcohol use: Yes     Comment: 3drinks per day 21 per week     If you drink alcohol do you typically have >3 drinks per day or >7 drinks per week? Yes      Alcohol Use 12/11/2020   Prescreen: >3 drinks/day or >7 drinks/week? Yes   Prescreen: >3 drinks/day or >7 drinks/week? -   AUDIT SCORE  11     AUDIT - Alcohol Use Disorders Identification Test - Reproduced from the World Health Organization Audit 2001 (Second Edition) 12/11/2020   1.  How often do you have a drink containing alcohol? 4 or more times a week   2.  How many drinks containing alcohol do you have on a typical day when you are drinking? 3 or 4   3.  How often do you have five or more drinks on one occasion? Weekly   4.  How often during the last year have  you found that you were not able to stop drinking once you had started? Never   5.  How often during the last year have you failed to do what was normally expected of you because of drinking? Never   6.  How often during the last year have you needed a first drink in the morning to get yourself going after a heavy drinking session? Never   7.  How often during the last year have you had a feeling of guilt or remorse after drinking? Less than monthly   8.  How often during the last year have you been unable to remember what happened the night before because of your drinking? Monthly   9.  Have you or someone else been injured because of your drinking? No   10. Has a relative, friend, doctor or other health care worker been concerned about your drinking or suggested you cut down? No   TOTAL SCORE 11       Last PSA: No results found for: PSA    Reviewed orders with patient. Reviewed health maintenance and updated orders accordingly - Yes       Reviewed and updated as needed this visit by clinical staff  Tobacco  Allergies  Meds   Med Hx  Surg Hx  Fam Hx  Soc Hx        Reviewed and updated as needed this visit by Provider                    Review of Systems   Constitutional: Negative for chills and fever.   HENT: Negative for congestion, ear pain, hearing loss and sore throat.    Eyes: Negative for pain and visual disturbance.   Respiratory: Negative for cough and shortness of breath.    Cardiovascular: Positive for chest pain. Negative for palpitations and peripheral edema.   Gastrointestinal: Negative for abdominal pain, constipation, diarrhea, heartburn, hematochezia and nausea.   Genitourinary: Negative for discharge, dysuria, frequency, genital sores, hematuria, impotence and urgency.   Musculoskeletal: Negative for arthralgias, joint swelling and myalgias.   Skin: Negative for rash.   Neurological: Negative for dizziness, weakness, headaches and paresthesias.   Psychiatric/Behavioral: Negative for mood  "changes. The patient is nervous/anxious.      Mostly still working from home  Kept son out of  for a while    No covid exposure    To eye doctor recently, cholesterol buildup    Patient thought the chest pains were anxiety    Pain random    Now eating oatmeal    Walking 45 minutes  Daily    No chest pain with exertion    Niece breast cancer    Dad lung cancer    Neurology for tremor    3-4 drinks per night    One child, good health        OBJECTIVE:   /81 (BP Location: Left arm, Patient Position: Chair)   Pulse 68   Temp 97.9  F (36.6  C) (Oral)   Ht 1.81 m (5' 11.26\")   Wt 103.9 kg (229 lb)   BMI 31.71 kg/m      Physical Exam  GENERAL: healthy, alert and no distress  EYES: Eyes grossly normal to inspection, PERRL and conjunctivae and sclerae normal  HENT: ear canals and TM's normal, nose and mouth without ulcers or lesions  NECK: no adenopathy, no asymmetry, masses, or scars and thyroid normal to palpation  RESP: lungs clear to auscultation - no rales, rhonchi or wheezes  CV: regular rate and rhythm, normal S1 S2, no S3 or S4, no murmur, click or rub, no peripheral edema and peripheral pulses strong  ABDOMEN: soft, nontender, no hepatosplenomegaly, no masses and bowel sounds normal  MS: no gross musculoskeletal defects noted, no edema  SKIN: no suspicious lesions or rashes  NEURO: Normal strength and tone, mentation intact and speech normal  PSYCH: mentation appears normal, affect normal/bright    Diagnostic Test Results:  Labs reviewed in Epic    ASSESSMENT/PLAN:   Eleazar was seen today for physical and health maintenance.    Diagnoses and all orders for this visit:    Routine general medical examination at a health care facility    Need for Td vaccine  -     TD PRESERV FREE, IM (7+ YRS)  -     SCREENING QUESTIONS FOR ADULT IMMUNIZATIONS    Hyperlipidemia LDL goal <100  -     Lipid panel reflex to direct LDL Fasting; Future  -     Comprehensive metabolic panel; Future    Screening for prostate " "cancer  -     Prostate spec antigen screen; Future    Screening for diabetes mellitus  -     Hemoglobin A1c; Future    Vitamin D deficiency disease  -     Vitamin D Deficiency; Future    Fatigue, unspecified type  -     TSH with free T4 reflex; Future  -     CBC with platelets differential; Future    Overall patient stable  Check labs fasting; patient to call and schedule  Keep working on healthy diet/exercise and wt loss  Td given  No std concern  Decrease etoh use        Patient has been advised of split billing requirements and indicates understanding: Yes  COUNSELING:   Reviewed preventive health counseling, as reflected in patient instructions       Regular exercise       Healthy diet/nutrition       Vision screening       Family planning       Safe sex practices/STD prevention       Prostate cancer screening    Estimated body mass index is 31.71 kg/m  as calculated from the following:    Height as of this encounter: 1.81 m (5' 11.26\").    Weight as of this encounter: 103.9 kg (229 lb).     Weight management plan: Discussed healthy diet and exercise guidelines    He reports that he has quit smoking. He smoked 0.00 packs per day. He has never used smokeless tobacco.      Counseling Resources:  ATP IV Guidelines  Pooled Cohorts Equation Calculator  FRAX Risk Assessment  ICSI Preventive Guidelines  Dietary Guidelines for Americans, 2010  USDA's MyPlate  ASA Prophylaxis  Lung CA Screening    Scot Raygoza MD  [facility]  "

## 2020-12-22 DIAGNOSIS — R53.83 FATIGUE, UNSPECIFIED TYPE: ICD-10-CM

## 2020-12-22 DIAGNOSIS — Z12.5 SCREENING FOR PROSTATE CANCER: ICD-10-CM

## 2020-12-22 DIAGNOSIS — E55.9 VITAMIN D DEFICIENCY DISEASE: ICD-10-CM

## 2020-12-22 DIAGNOSIS — E78.5 HYPERLIPIDEMIA LDL GOAL <100: ICD-10-CM

## 2020-12-22 DIAGNOSIS — Z13.1 SCREENING FOR DIABETES MELLITUS: ICD-10-CM

## 2020-12-22 LAB
ALBUMIN SERPL-MCNC: 4.1 G/DL (ref 3.4–5)
ALP SERPL-CCNC: 81 U/L (ref 40–150)
ALT SERPL W P-5'-P-CCNC: 72 U/L (ref 0–70)
ANION GAP SERPL CALCULATED.3IONS-SCNC: 3 MMOL/L (ref 3–14)
AST SERPL W P-5'-P-CCNC: 26 U/L (ref 0–45)
BASOPHILS # BLD AUTO: 0 10E9/L (ref 0–0.2)
BASOPHILS NFR BLD AUTO: 0.6 %
BILIRUB SERPL-MCNC: 0.7 MG/DL (ref 0.2–1.3)
BUN SERPL-MCNC: 19 MG/DL (ref 7–30)
CALCIUM SERPL-MCNC: 9.2 MG/DL (ref 8.5–10.1)
CHLORIDE SERPL-SCNC: 110 MMOL/L (ref 94–109)
CHOLEST SERPL-MCNC: 270 MG/DL
CO2 SERPL-SCNC: 29 MMOL/L (ref 20–32)
CREAT SERPL-MCNC: 0.86 MG/DL (ref 0.66–1.25)
DIFFERENTIAL METHOD BLD: NORMAL
EOSINOPHIL # BLD AUTO: 0.1 10E9/L (ref 0–0.7)
EOSINOPHIL NFR BLD AUTO: 2 %
ERYTHROCYTE [DISTWIDTH] IN BLOOD BY AUTOMATED COUNT: 13.3 % (ref 10–15)
GFR SERPL CREATININE-BSD FRML MDRD: >90 ML/MIN/{1.73_M2}
GLUCOSE SERPL-MCNC: 90 MG/DL (ref 70–99)
HBA1C MFR BLD: 4.9 % (ref 0–5.6)
HCT VFR BLD AUTO: 42.2 % (ref 40–53)
HDLC SERPL-MCNC: 67 MG/DL
HGB BLD-MCNC: 14.7 G/DL (ref 13.3–17.7)
LDLC SERPL CALC-MCNC: 181 MG/DL
LYMPHOCYTES # BLD AUTO: 1.7 10E9/L (ref 0.8–5.3)
LYMPHOCYTES NFR BLD AUTO: 31.7 %
MCH RBC QN AUTO: 30.2 PG (ref 26.5–33)
MCHC RBC AUTO-ENTMCNC: 34.8 G/DL (ref 31.5–36.5)
MCV RBC AUTO: 87 FL (ref 78–100)
MONOCYTES # BLD AUTO: 0.5 10E9/L (ref 0–1.3)
MONOCYTES NFR BLD AUTO: 8.5 %
NEUTROPHILS # BLD AUTO: 3.1 10E9/L (ref 1.6–8.3)
NEUTROPHILS NFR BLD AUTO: 57.2 %
NONHDLC SERPL-MCNC: 203 MG/DL
PLATELET # BLD AUTO: 250 10E9/L (ref 150–450)
POTASSIUM SERPL-SCNC: 4.1 MMOL/L (ref 3.4–5.3)
PROT SERPL-MCNC: 7.1 G/DL (ref 6.8–8.8)
PSA SERPL-ACNC: 0.47 UG/L (ref 0–4)
RBC # BLD AUTO: 4.87 10E12/L (ref 4.4–5.9)
SODIUM SERPL-SCNC: 142 MMOL/L (ref 133–144)
TRIGL SERPL-MCNC: 111 MG/DL
TSH SERPL DL<=0.005 MIU/L-ACNC: 2.2 MU/L (ref 0.4–4)
WBC # BLD AUTO: 5.4 10E9/L (ref 4–11)

## 2020-12-22 PROCEDURE — 82306 VITAMIN D 25 HYDROXY: CPT | Performed by: FAMILY MEDICINE

## 2020-12-22 PROCEDURE — 80050 GENERAL HEALTH PANEL: CPT | Performed by: FAMILY MEDICINE

## 2020-12-22 PROCEDURE — G0103 PSA SCREENING: HCPCS | Performed by: FAMILY MEDICINE

## 2020-12-22 PROCEDURE — 36415 COLL VENOUS BLD VENIPUNCTURE: CPT | Performed by: FAMILY MEDICINE

## 2020-12-22 PROCEDURE — 80061 LIPID PANEL: CPT | Performed by: FAMILY MEDICINE

## 2020-12-22 PROCEDURE — 83036 HEMOGLOBIN GLYCOSYLATED A1C: CPT | Performed by: FAMILY MEDICINE

## 2020-12-22 NOTE — LETTER
December 24, 2020      Eleazar Herrera  [address] [facility] [address]        Dear ,    We are writing to inform you of your test results.    Vitamin D level is a little low. Advise taking 2000 units of over the counter vitamin D daily.     Cholesterol is quite high.  6 years ago was nearly normal.       Keep working on healthy diet/exercise. Advise minimizing saturated fats and avoiding trans fats.     Other labs are okay       Resulted Orders   Vitamin D Deficiency   Result Value Ref Range    Vitamin D Deficiency screening 18 (L) 20 - 75 ug/L      Comment:      Season, race, dietary intake, and treatment affect the concentration of   25-hydroxy-Vitamin D. Values may decrease during winter months and increase   during summer months. Values 20-29 ug/L may indicate Vitamin D insufficiency   and values <20 ug/L may indicate Vitamin D deficiency.  Vitamin D determination is routinely performed by an immunoassay specific for   25 hydroxyvitamin D3.  If an individual is on vitamin D2 (ergocalciferol)   supplementation, please specify 25 OH vitamin D2 and D3 level determination by   LCMSMS test VITD23.     Prostate spec antigen screen   Result Value Ref Range    PSA 0.47 0 - 4 ug/L      Comment:      Assay Method:  Chemiluminescence using Siemens Vista analyzer   Hemoglobin A1c   Result Value Ref Range    Hemoglobin A1C 4.9 0 - 5.6 %      Comment:      Normal <5.7% Prediabetes 5.7-6.4%  Diabetes 6.5% or higher - adopted from ADA   consensus guidelines.     CBC with platelets differential   Result Value Ref Range    WBC 5.4 4.0 - 11.0 10e9/L    RBC Count 4.87 4.4 - 5.9 10e12/L    Hemoglobin 14.7 13.3 - 17.7 g/dL    Hematocrit 42.2 40.0 - 53.0 %    MCV 87 78 - 100 fl    MCH 30.2 26.5 - 33.0 pg    MCHC 34.8 31.5 - 36.5 g/dL    RDW 13.3 10.0 - 15.0 %    Platelet Count 250 150 - 450 10e9/L    Diff Method Automated Method     % Neutrophils 57.2 %    % Lymphocytes 31.7 %    % Monocytes 8.5 %    %  Eosinophils 2.0 %    % Basophils 0.6 %    Absolute Neutrophil 3.1 1.6 - 8.3 10e9/L    Absolute Lymphocytes 1.7 0.8 - 5.3 10e9/L    Absolute Monocytes 0.5 0.0 - 1.3 10e9/L    Absolute Eosinophils 0.1 0.0 - 0.7 10e9/L    Absolute Basophils 0.0 0.0 - 0.2 10e9/L   TSH with free T4 reflex   Result Value Ref Range    TSH 2.20 0.40 - 4.00 mU/L   Comprehensive metabolic panel   Result Value Ref Range    Sodium 142 133 - 144 mmol/L    Potassium 4.1 3.4 - 5.3 mmol/L    Chloride 110 (H) 94 - 109 mmol/L    Carbon Dioxide 29 20 - 32 mmol/L    Anion Gap 3 3 - 14 mmol/L    Glucose 90 70 - 99 mg/dL      Comment:      Fasting specimen    Urea Nitrogen 19 7 - 30 mg/dL    Creatinine 0.86 0.66 - 1.25 mg/dL    GFR Estimate >90 >60 mL/min/[1.73_m2]      Comment:      Non  GFR Calc  Starting 12/18/2018, serum creatinine based estimated GFR (eGFR) will be   calculated using the Chronic Kidney Disease Epidemiology Collaboration   (CKD-EPI) equation.      GFR Estimate If Black >90 >60 mL/min/[1.73_m2]      Comment:       GFR Calc  Starting 12/18/2018, serum creatinine based estimated GFR (eGFR) will be   calculated using the Chronic Kidney Disease Epidemiology Collaboration   (CKD-EPI) equation.      Calcium 9.2 8.5 - 10.1 mg/dL    Bilirubin Total 0.7 0.2 - 1.3 mg/dL    Albumin 4.1 3.4 - 5.0 g/dL    Protein Total 7.1 6.8 - 8.8 g/dL    Alkaline Phosphatase 81 40 - 150 U/L    ALT 72 (H) 0 - 70 U/L    AST 26 0 - 45 U/L   Lipid panel reflex to direct LDL Fasting   Result Value Ref Range    Cholesterol 270 (H) <200 mg/dL      Comment:      Desirable:       <200 mg/dl    Triglycerides 111 <150 mg/dL      Comment:      Fasting specimen    HDL Cholesterol 67 >39 mg/dL    LDL Cholesterol Calculated 181 (H) <100 mg/dL      Comment:      Above desirable:  100-129 mg/dl  Borderline High:  130-159 mg/dL  High:             160-189 mg/dL  Very high:       >189 mg/dl      Non HDL Cholesterol 203 (H) <130 mg/dL      Comment:       Above Desirable:  130-159 mg/dl  Borderline high:  160-189 mg/dl  High:             190-219 mg/dl  Very high:       >219 mg/dl         If you have any questions or concerns, please call the clinic at the number listed above.       Sincerely,      Scot Raygoza MD/[place]

## 2020-12-23 LAB — DEPRECATED CALCIDIOL+CALCIFEROL SERPL-MC: 18 UG/L (ref 20–75)

## 2020-12-24 NOTE — RESULT ENCOUNTER NOTE
Vitamin D level is a little low. Advise taking 2000 units of over the counter vitamin D daily.    Cholesterol is quite high.  6 years ago was nearly normal.      Keep working on healthy diet/exercise. Advise minimizing saturated fats and avoiding trans fats.    Other labs are okay     Scot Raygoza MD

## 2021-09-05 ENCOUNTER — HEALTH MAINTENANCE LETTER (OUTPATIENT)
Age: 46
End: 2021-09-05

## 2022-02-20 ENCOUNTER — HEALTH MAINTENANCE LETTER (OUTPATIENT)
Age: 47
End: 2022-02-20

## 2022-02-25 ENCOUNTER — OFFICE VISIT (OUTPATIENT)
Dept: FAMILY MEDICINE | Facility: CLINIC | Age: 47
End: 2022-02-25
Payer: COMMERCIAL

## 2022-02-25 VITALS
TEMPERATURE: 97.8 F | HEART RATE: 78 BPM | HEIGHT: 71 IN | SYSTOLIC BLOOD PRESSURE: 132 MMHG | OXYGEN SATURATION: 96 % | BODY MASS INDEX: 31.25 KG/M2 | WEIGHT: 223.2 LBS | RESPIRATION RATE: 18 BRPM | DIASTOLIC BLOOD PRESSURE: 75 MMHG

## 2022-02-25 DIAGNOSIS — Z12.11 SCREEN FOR COLON CANCER: ICD-10-CM

## 2022-02-25 DIAGNOSIS — F41.9 ANXIETY: Primary | ICD-10-CM

## 2022-02-25 DIAGNOSIS — E78.5 HYPERLIPIDEMIA LDL GOAL <100: ICD-10-CM

## 2022-02-25 DIAGNOSIS — E66.9 OBESITY, UNSPECIFIED CLASSIFICATION, UNSPECIFIED OBESITY TYPE, UNSPECIFIED WHETHER SERIOUS COMORBIDITY PRESENT: ICD-10-CM

## 2022-02-25 PROCEDURE — 99213 OFFICE O/P EST LOW 20 MIN: CPT | Performed by: FAMILY MEDICINE

## 2022-02-25 RX ORDER — ASPIRIN 81 MG/1
81 TABLET ORAL DAILY
COMMUNITY

## 2022-02-25 ASSESSMENT — PAIN SCALES - GENERAL: PAINLEVEL: NO PAIN (0)

## 2022-02-25 ASSESSMENT — PATIENT HEALTH QUESTIONNAIRE - PHQ9: SUM OF ALL RESPONSES TO PHQ QUESTIONS 1-9: 3

## 2022-02-25 NOTE — PROGRESS NOTES
"       Subjective   Eleazar is a 47 year old who presents for the following health issues   HPI   Chief Complaint   Patient presents with     Follow Up            Review of Systems   [facility]    Only med is asa 81 daily    Not much exercise currently    Dad  lung ca    Cut down on etoh   2 drinks per night    Lots of stress    Seated work     getting divorce    1 child    Not much stress relief    Sleeping okay            Objective    BP (!) 144/90 (BP Location: Right arm, Cuff Size: Adult Regular)   Pulse 78   Temp 97.8  F (36.6  C) (Oral)   Resp 18   Ht 1.814 m (5' 11.42\")   Wt 101.2 kg (223 lb 3.2 oz)   SpO2 96%   BMI 30.77 kg/m    Body mass index is 30.77 kg/m .  Physical Exam  Constitutional:       Appearance: He is well-developed.   HENT:      Head: Normocephalic and atraumatic.   Eyes:      Conjunctiva/sclera: Conjunctivae normal.   Neck:      Vascular: No carotid bruit.   Cardiovascular:      Rate and Rhythm: Normal rate and regular rhythm.      Heart sounds: Normal heart sounds.   Pulmonary:      Effort: Pulmonary effort is normal. No respiratory distress.      Breath sounds: Normal breath sounds.   Neurological:      Mental Status: He is alert and oriented to person, place, and time.      Cranial Nerves: No cranial nerve deficit.   Psychiatric:         Speech: Speech normal.         Behavior: Behavior normal.         ASSESSMENT / PLAN:  (F41.9) Anxiety  (primary encounter diagnosis)  Comment: patient would like to go back on this; discussed in detail   Plan: sertraline (ZOLOFT) 50 MG tablet             (E78.5) Hyperlipidemia LDL goal <100  Comment: discussed in detail.  Recent LDL was in 150s but he had a coronary calcium score of zero so no need for statin   Plan: keep working on healthy diet/exercise     (Z12.11) Screen for colon cancer  Comment: discussed   Plan: he wants to hold off on colonscopy for now     (E66.9) Obesity, unspecified classification, unspecified " obesity type, unspecified whether serious comorbidity present  Comment: keep working on healthy diet/exercise and wt loss  Plan: as above.  With normal stress test and echo, patient has green light to exercise.      I reviewed the patient's medications, allergies, medical history, family history, and social history.    Scot Raygoza MD

## 2022-02-25 NOTE — PATIENT INSTRUCTIONS
Decrease saturated fat     Avoid trans fat    Increase exercise as much as possible    Start sertraline    Call/ return to clinic with problems/ questions

## 2022-04-20 NOTE — TELEPHONE ENCOUNTER
RECORDS RECEIVED FROM: self (LOV 2018)   REASON FOR VISIT: tremors   Date of Appt: 7/12/22   NOTES (FOR ALL VISITS) STATUS DETAILS   OFFICE NOTE from other specialist Internal Dr Arce @ [facility]:  9/21/18  4/14/16  4/14/15   MEDICATION LIST Internal    IMAGING  (FOR ALL VISITS)     MRI (HEAD, NECK, SPINE) Internal [facility]:  MRI Lumbar Spine 2/6/19  MRI Lumbar Spine 3/18/15

## 2022-04-22 ENCOUNTER — TELEPHONE (OUTPATIENT)
Dept: OTOLARYNGOLOGY | Facility: CLINIC | Age: 47
End: 2022-04-22
Payer: COMMERCIAL

## 2022-04-22 NOTE — TELEPHONE ENCOUNTER
Patient states that he still thinks he has an ear infection that has not cleared with antibiotics and wonders if he should wait to see you pm 5-4 or not.  Please call.    Thank you.

## 2022-04-28 NOTE — TELEPHONE ENCOUNTER
Returned call to follow up on the below. Apologized x2. Pt has gone elsewhere to have this addressed.    Sayda DENNISON RN Specialty Triage 4/28/2022 1:29 PM
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
## 2022-07-12 ENCOUNTER — PRE VISIT (OUTPATIENT)
Dept: NEUROLOGY | Facility: CLINIC | Age: 47
End: 2022-07-12
Payer: COMMERCIAL

## 2022-07-12 ENCOUNTER — OFFICE VISIT (OUTPATIENT)
Dept: NEUROLOGY | Facility: CLINIC | Age: 47
End: 2022-07-12
Payer: COMMERCIAL

## 2022-07-12 ENCOUNTER — TELEPHONE (OUTPATIENT)
Dept: LAB | Facility: CLINIC | Age: 47
End: 2022-07-12

## 2022-07-12 VITALS
HEART RATE: 66 BPM | BODY MASS INDEX: 30.33 KG/M2 | WEIGHT: 220 LBS | SYSTOLIC BLOOD PRESSURE: 126 MMHG | DIASTOLIC BLOOD PRESSURE: 85 MMHG | OXYGEN SATURATION: 95 %

## 2022-07-12 DIAGNOSIS — F43.21 ADJUSTMENT DISORDER WITH DEPRESSED MOOD: ICD-10-CM

## 2022-07-12 DIAGNOSIS — Z82.0 FAMILY HISTORY OF PARKINSON'S DISEASE: ICD-10-CM

## 2022-07-12 DIAGNOSIS — R25.1 TREMOR: Primary | ICD-10-CM

## 2022-07-12 DIAGNOSIS — Z01.89 LABORATORY TEST: ICD-10-CM

## 2022-07-12 PROBLEM — G47.33 OSA (OBSTRUCTIVE SLEEP APNEA): Status: RESOLVED | Noted: 2018-11-14 | Resolved: 2022-07-12

## 2022-07-12 PROCEDURE — 99205 OFFICE O/P NEW HI 60 MIN: CPT | Performed by: PSYCHIATRY & NEUROLOGY

## 2022-07-12 RX ORDER — FLUTICASONE PROPIONATE 50 MCG
2 SPRAY, SUSPENSION (ML) NASAL PRN
COMMUNITY
Start: 2022-04-03 | End: 2022-11-08

## 2022-07-12 ASSESSMENT — PAIN SCALES - GENERAL: PAINLEVEL: NO PAIN (0)
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
## 2022-07-12 NOTE — PROGRESS NOTES
GENETIC COUNSELING-Neurology  I spoke with Daryl Herrera at the request of Dr. Arce. He had questions about what genetic testing had been done in the past for Parkinson disease. I indicated that he had a fairly complete panel of genes in 2015 with no mutations identified.      TEST REQUESTED:  Parkinson disease.  Next generation sequencing of:   PWV92U0, AT, CHMP2B, COMT, DCTN1, DNAJC6, EIF4G1, FBXO7, GBA,   GIGYF2, GRN, HTRA2, LRRK2, MAPT, NR4A2, PARK2, PARK7, PINK1, PLA2G6,   SNCA, SNCAIP, SNCB, UCHL1, VPS35     Daryl Barrientos, MS, [facility]  Certified Genetic Counselor
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
## 2022-07-28 ENCOUNTER — VIRTUAL VISIT (OUTPATIENT)
Dept: PHARMACY | Facility: CLINIC | Age: 47
End: 2022-07-28
Attending: PSYCHIATRY & NEUROLOGY
Payer: COMMERCIAL

## 2022-07-28 DIAGNOSIS — J30.2 SEASONAL ALLERGIC RHINITIS, UNSPECIFIED TRIGGER: ICD-10-CM

## 2022-07-28 DIAGNOSIS — F32.A DEPRESSION, UNSPECIFIED DEPRESSION TYPE: ICD-10-CM

## 2022-07-28 DIAGNOSIS — F41.9 ANXIETY: ICD-10-CM

## 2022-07-28 DIAGNOSIS — Z79.82 LONG TERM (CURRENT) USE OF ASPIRIN: ICD-10-CM

## 2022-07-28 DIAGNOSIS — R25.1 TREMOR: Primary | ICD-10-CM

## 2022-07-28 PROCEDURE — 99605 MTMS BY PHARM NP 15 MIN: CPT | Performed by: PHARMACIST

## 2022-07-28 PROCEDURE — 99607 MTMS BY PHARM ADDL 15 MIN: CPT | Performed by: PHARMACIST

## 2022-07-28 NOTE — PROGRESS NOTES
Medication Therapy Management (MTM) Encounter    ASSESSMENT:                            Medication Adherence/Access: No issues identified    Tremor: reviewed the various medication options for essential tremor including: beta blockers, primidone, topiramate, gabapentin, and benzodiazepines. We discussed that propranolol and primidone are usually most effective but the others can be helpful as well.     Anxiety/depression: stable     Allergic Rhinitis: stable     Heart health: discussed that aspirin has fallen out of favor for primary prevention unless there are significant risk factors. He could discuss further with his PCP/cardiologist.     PLAN:                            We reviewed the various medication options for essential tremor. Please let us know if you would like to try any of these medications:     1. Beta blockers: propranolol (Inderal) - usually most effective when taken as-needed but can also be effective daily. You've had chest pain but other side effects can include dizziness and possibly worsen depression.     2. Primidone - one of the 2 most effective medications for tremor. Side effects can include drowsiness, dizziness, cognitive impairment, especially at higher doses. This would be dosed once daily at night or two daily doses.     3. Topiramate (Topamax) - may be less effective than primidone but can be helpful. Side effects can include drowsiness, dizziness, cognitive impairment, decreased appetite/weight loss, tingling in extremities, and weight loss. Recommended to drink a lot of water while taking. This would be dosed once daily at night or two daily doses.     4. Gabapentin - may be less effective than gabapentin or primidone. Side effects can include drowsiness, dizziness, cognitive impairment, weight gain/swelling in ankles. May also worsen depression but can improve anxiety.  This would be dosed 1-3 times daily.     5. Benzodiazepines - may be helpful in the short-term but have  long-term risks including addiction/dependence and side effects such as dizziness, drowsiness, cognitive impairment.     Follow-up: as needed     SUBJECTIVE/OBJECTIVE:                          Eleazar Herrera is a 47 year old male called for an initial visit. He was referred to me from Dr. Arce.      Reason for visit: medication review, discuss medications for tremor.    Allergies/ADRs: Reviewed in chart  Past Medical History: Reviewed in chart  Tobacco: He reports that he has quit smoking. He smoked 0.00 packs per day. He has never used smokeless tobacco.  Alcohol: drinking about 2-4 drinks nightly- reports he is working on cutting back on alcohol and if he feels he can't do it on his own will ask for help   Caffeine: coffee 4/AM and 1 soda during the day    Medication Adherence/Access: no issues reported    Tremor: Not taking any medications currently. Reports he tried propranolol regularly in the past and it reportedly caused chest pain and was stopped. He has some propranolol that he takes as needed and seems to tolerate it okay. He notes that he's had more stress in the last few years and this is affecting his tremor. He notices his tremor when buttoning his shirts but otherwise it isn't too bothersome. He is interested in learning more about medications for tremor.     Anxiety/depression: Taking sertraline 50 mg daily. Reports that it's been a helpful medication for now and doesn't feel he needs anything else at this time.    PHQ 2/25/2022   PHQ-9 Total Score 3   Q9: Thoughts of better off dead/self-harm past 2 weeks Not at all     Allergic Rhinitis: Current medications include Flonase as needed, no concerns reported.    Heart health: taking Aspirin 81 mg daily for general health health, no concerns reported.     Today's Vitals: There were no vitals taken for this visit.     Last vitals:    BP Readings from Last 1 Encounters:   07/12/22 126/85     Pulse Readings from Last 1 Encounters:   07/12/22 66      ----------------    I spent 27 minutes with this patient today. I offer these suggestions for consideration by Dr. Arce. A copy of the visit note was provided to the patient's provider(s).    The patient was sent via [employer] a summary of these recommendations.     Trang Joy, Pharm.D.  Medication Therapy Management Pharmacist  [facility] Neu[facility]    Telemedicine Visit Details  Type of service:  Telephone visit  Start Time: 2:00 PM  End Time: 2:27 PM  Originating Location (patient location): Home  Distant Location (provider location):  [facility] NEUROLOGY CLINIC     Medication Therapy Recommendations  No medication therapy recommendations to display

## 2022-07-28 NOTE — PATIENT INSTRUCTIONS
"Recommendations from today's MTM visit:                                                    MTM (medication therapy management) is a service provided by a clinical pharmacist designed to help you get the most of out of your medicines.      We reviewed the various medication options for essential tremor. Please let us know if you would like to try any of these medications:     1. Beta blockers: propranolol (Inderal) - usually most effective when taken as-needed but can also be effective daily. You've had chest pain but other side effects can include dizziness and possibly worsen depression.     2. Primidone - one of the 2 most effective medications for tremor. Side effects can include drowsiness, dizziness, cognitive impairment, especially at higher doses. This would be dosed once daily at night or two daily doses.     3. Topiramate (Topamax) - may be less effective than primidone but can be helpful. Side effects can include drowsiness, dizziness, cognitive impairment, decreased appetite/weight loss, tingling in extremities, and weight loss. Recommended to drink a lot of water while taking. This would be dosed once daily at night or two daily doses.     4. Gabapentin - may be less effective than gabapentin or primidone. Side effects can include drowsiness, dizziness, cognitive impairment, weight gain/swelling in ankles. May also worsen depression but can improve anxiety.  This would be dosed 1-3 times daily.     5. Benzodiazepines - may be helpful in the short-term but have long-term risks including addiction/dependence and side effects such as dizziness, drowsiness, cognitive impairment.     Follow-up: as needed     It was great speaking with you today.  I value your experience and would be very thankful for your time in providing feedback in our clinic survey. In the next few days, you may receive an email or text message from [employer] with a link to a survey related to your  clinical pharmacist.\"     To schedule " another MTM appointment, please call the clinic directly or you may call the MTM scheduling line at [phone] or toll-free at [phone].     My Clinical Pharmacist's contact information:                                                      Please feel free to contact me with any questions or concerns you have.      Trang Joy, Pharm.D.  Medication Therapy Management Pharmacist  [facility]

## 2022-10-23 ENCOUNTER — HEALTH MAINTENANCE LETTER (OUTPATIENT)
Age: 47
End: 2022-10-23

## 2022-11-08 ENCOUNTER — OFFICE VISIT (OUTPATIENT)
Dept: FAMILY MEDICINE | Facility: CLINIC | Age: 47
End: 2022-11-08
Payer: COMMERCIAL

## 2022-11-08 VITALS
TEMPERATURE: 97.2 F | HEART RATE: 71 BPM | BODY MASS INDEX: 30.9 KG/M2 | WEIGHT: 224.13 LBS | OXYGEN SATURATION: 99 % | SYSTOLIC BLOOD PRESSURE: 116 MMHG | DIASTOLIC BLOOD PRESSURE: 79 MMHG

## 2022-11-08 DIAGNOSIS — R53.83 FATIGUE, UNSPECIFIED TYPE: ICD-10-CM

## 2022-11-08 DIAGNOSIS — E78.5 HYPERLIPIDEMIA LDL GOAL <100: ICD-10-CM

## 2022-11-08 DIAGNOSIS — R21 FACIAL RASH: ICD-10-CM

## 2022-11-08 DIAGNOSIS — B35.4 TINEA CORPORIS: ICD-10-CM

## 2022-11-08 DIAGNOSIS — Z12.5 SCREENING FOR PROSTATE CANCER: ICD-10-CM

## 2022-11-08 DIAGNOSIS — K13.0 CHAPPED LIPS: ICD-10-CM

## 2022-11-08 DIAGNOSIS — F41.9 ANXIETY: Primary | ICD-10-CM

## 2022-11-08 DIAGNOSIS — E78.5 HYPERLIPIDEMIA LDL GOAL <100: Primary | ICD-10-CM

## 2022-11-08 DIAGNOSIS — Z13.1 SCREENING FOR DIABETES MELLITUS: ICD-10-CM

## 2022-11-08 DIAGNOSIS — E55.9 VITAMIN D DEFICIENCY DISEASE: ICD-10-CM

## 2022-11-08 LAB
ALBUMIN SERPL-MCNC: 4.1 G/DL (ref 3.4–5)
ALP SERPL-CCNC: 74 U/L (ref 40–150)
ALT SERPL W P-5'-P-CCNC: 39 U/L (ref 0–70)
ANION GAP SERPL CALCULATED.3IONS-SCNC: 4 MMOL/L (ref 3–14)
AST SERPL W P-5'-P-CCNC: 21 U/L (ref 0–45)
BASOPHILS # BLD AUTO: 0 10E3/UL (ref 0–0.2)
BASOPHILS NFR BLD AUTO: 1 %
BILIRUB SERPL-MCNC: 0.6 MG/DL (ref 0.2–1.3)
BUN SERPL-MCNC: 17 MG/DL (ref 7–30)
CALCIUM SERPL-MCNC: 9.1 MG/DL (ref 8.5–10.1)
CHLORIDE BLD-SCNC: 110 MMOL/L (ref 94–109)
CHOLEST SERPL-MCNC: 262 MG/DL
CO2 SERPL-SCNC: 27 MMOL/L (ref 20–32)
CREAT SERPL-MCNC: 0.86 MG/DL (ref 0.66–1.25)
DEPRECATED CALCIDIOL+CALCIFEROL SERPL-MC: 28 UG/L (ref 20–75)
EOSINOPHIL # BLD AUTO: 0.1 10E3/UL (ref 0–0.7)
EOSINOPHIL NFR BLD AUTO: 2 %
ERYTHROCYTE [DISTWIDTH] IN BLOOD BY AUTOMATED COUNT: 13.4 % (ref 10–15)
FASTING STATUS PATIENT QL REPORTED: ABNORMAL
GFR SERPL CREATININE-BSD FRML MDRD: >90 ML/MIN/1.73M2
GLUCOSE BLD-MCNC: 96 MG/DL (ref 70–99)
HBA1C MFR BLD: 5.1 % (ref 0–5.6)
HCT VFR BLD AUTO: 42.8 % (ref 40–53)
HDLC SERPL-MCNC: 69 MG/DL
HGB BLD-MCNC: 14.9 G/DL (ref 13.3–17.7)
LDLC SERPL CALC-MCNC: 171 MG/DL
LYMPHOCYTES # BLD AUTO: 1.5 10E3/UL (ref 0.8–5.3)
LYMPHOCYTES NFR BLD AUTO: 30 %
MCH RBC QN AUTO: 30.8 PG (ref 26.5–33)
MCHC RBC AUTO-ENTMCNC: 34.8 G/DL (ref 31.5–36.5)
MCV RBC AUTO: 88 FL (ref 78–100)
MONOCYTES # BLD AUTO: 0.5 10E3/UL (ref 0–1.3)
MONOCYTES NFR BLD AUTO: 10 %
NEUTROPHILS # BLD AUTO: 3 10E3/UL (ref 1.6–8.3)
NEUTROPHILS NFR BLD AUTO: 58 %
NONHDLC SERPL-MCNC: 193 MG/DL
PLATELET # BLD AUTO: 235 10E3/UL (ref 150–450)
POTASSIUM BLD-SCNC: 4.4 MMOL/L (ref 3.4–5.3)
PROT SERPL-MCNC: 7.1 G/DL (ref 6.8–8.8)
PSA SERPL-MCNC: 0.52 UG/L (ref 0–4)
RBC # BLD AUTO: 4.84 10E6/UL (ref 4.4–5.9)
SODIUM SERPL-SCNC: 141 MMOL/L (ref 133–144)
TRIGL SERPL-MCNC: 109 MG/DL
TSH SERPL DL<=0.005 MIU/L-ACNC: 1.71 MU/L (ref 0.4–4)
WBC # BLD AUTO: 5.1 10E3/UL (ref 4–11)

## 2022-11-08 PROCEDURE — 36415 COLL VENOUS BLD VENIPUNCTURE: CPT | Performed by: FAMILY MEDICINE

## 2022-11-08 PROCEDURE — 99214 OFFICE O/P EST MOD 30 MIN: CPT | Performed by: FAMILY MEDICINE

## 2022-11-08 PROCEDURE — 83036 HEMOGLOBIN GLYCOSYLATED A1C: CPT | Performed by: FAMILY MEDICINE

## 2022-11-08 PROCEDURE — 80050 GENERAL HEALTH PANEL: CPT | Performed by: FAMILY MEDICINE

## 2022-11-08 PROCEDURE — 82306 VITAMIN D 25 HYDROXY: CPT | Performed by: FAMILY MEDICINE

## 2022-11-08 PROCEDURE — 80061 LIPID PANEL: CPT | Performed by: FAMILY MEDICINE

## 2022-11-08 PROCEDURE — G0103 PSA SCREENING: HCPCS | Performed by: FAMILY MEDICINE

## 2022-11-08 RX ORDER — ATORVASTATIN CALCIUM 10 MG/1
10 TABLET, FILM COATED ORAL DAILY
Qty: 30 TABLET | Refills: 2 | Status: SHIPPED | OUTPATIENT
Start: 2022-11-08 | End: 2022-12-08

## 2022-11-08 RX ORDER — CLOTRIMAZOLE 1 %
CREAM (GRAM) TOPICAL 2 TIMES DAILY PRN
Qty: 30 G | Refills: 1 | Status: SHIPPED | OUTPATIENT
Start: 2022-11-08

## 2022-11-08 ASSESSMENT — PAIN SCALES - GENERAL: PAINLEVEL: NO PAIN (0)

## 2022-11-08 NOTE — PROGRESS NOTES
Dale Bush is a 47 year old, presenting for the following health issues:  Patient Request      History of Present Illness       He eats 0-1 servings of fruits and vegetables daily.He consumes 2 sweetened beverage(s) daily.He exercises with enough effort to increase his heart rate 9 or less minutes per day.  He exercises with enough effort to increase his heart rate 3 or less days per week. He is missing 2 dose(s) of medications per week.        Review of Systems    patient on sertraline    Anxiety stable    Med still helping    Lots of stress recently    Working on lifestyle changes    May get divorce next year    No exercise recently    Signed up for gym    Mostly working at "[employer]    Working on reducing etoh intake    2 drinks daily beer    Trying to get away from hard liquor     Chapped/ cracked lips    Not currently on vit d    Sleep well    More stress at work      Objective    /79 (BP Location: Left arm, Patient Position: Chair, Cuff Size: Adult Large)   Pulse 71   Temp 97.2  F (36.2  C) (Temporal)   Wt 101.7 kg (224 lb 2 oz)   SpO2 99%   BMI 30.90 kg/m    Body mass index is 30.9 kg/m .  Physical Exam  Constitutional:       Appearance: He is well-developed and well-nourished.   HENT:      Head: Normocephalic and atraumatic.   Eyes:      Conjunctiva/sclera: Conjunctivae normal.   Neck:      Vascular: No carotid bruit.   Cardiovascular:      Rate and Rhythm: Normal rate and regular rhythm.      Pulses: Intact distal pulses.      Heart sounds: Normal heart sounds.   Pulmonary:      Effort: Pulmonary effort is normal. No respiratory distress.      Breath sounds: Normal breath sounds.   Abdominal:      Palpations: Abdomen is soft.      Tenderness: There is no abdominal tenderness.   Musculoskeletal:         General: No edema.   Neurological:      Mental Status: He is alert and oriented to person, place, and time.      Cranial Nerves: No cranial nerve deficit.   Psychiatric:          Mood and Affect: Mood and affect normal.         Speech: Speech normal.         Behavior: Behavior normal.         chapped lips  Evidence of shallow splits in lips     corners of mouth okay    Oral mucosa okay        ASSESSMENT / PLAN:  (F41.9) Anxiety  (primary encounter diagnosis)  Comment: stable on med, wants to stay on this  Plan: sertraline (ZOLOFT) 50 MG tablet             (B35.4) Tinea corporis  Comment: trial of this in irritated area on lips/ face; he had been trying a combo antifungal/ steroid cream  Plan: clotrimazole (LOTRIMIN) 1 % external cream             (R21) Facial rash  Comment: as above.  Also use moisturizer for lips   Plan: Adult Dermatology Referral        If not improving see dermatology     (K13.0) Chapped lips  Comment: as above  Plan: Adult Dermatology Referral             (E55.9) Vitamin D deficiency disease  Comment: on over the counter vit d   Plan: as above     (Z12.5) Screening for prostate cancer  Comment: psa   Plan:      (Z13.1) Screening for diabetes mellitus  Comment: check   Plan:      (R53.83) Fatigue, unspecified type  Comment: chec k  Plan:      (E78.5) Hyperlipidemia LDL goal <100  Comment: we printed prescription; fill if LDL quite high  Plan: atorvastatin (LIPITOR) 10 MG tablet, Lipid         panel reflex to direct LDL Non-fasting               I reviewed the patient's medications, allergies, medical history, family history, and social history.    Scot Raygoza MD

## 2022-11-08 NOTE — PATIENT INSTRUCTIONS
Hold prescription for atorvastatin ( cholesterol pill ); we will see what labs show    Keep working on healthy diet/exercise and wt loss    Use the antifungal cream on lips along with moisturizer     Could see dermatology     Stay on sertraline

## 2022-11-09 NOTE — RESULT ENCOUNTER NOTE
Cholesterol still quite high.  I suggest starting the atorvastatin.  If you get any bad side effects let us know  otherwise you can do a lab only visit in about 1 1/2 months to recheck labs fasting.    Other labs are fine.    Scot Raygoza MD

## 2022-12-07 DIAGNOSIS — E78.5 HYPERLIPIDEMIA LDL GOAL <100: ICD-10-CM

## 2022-12-08 RX ORDER — ATORVASTATIN CALCIUM 10 MG/1
TABLET, FILM COATED ORAL
Qty: 90 TABLET | Refills: 1 | Status: SHIPPED | OUTPATIENT
Start: 2022-12-08 | End: 2023-05-04

## 2023-03-22 ENCOUNTER — TELEPHONE (OUTPATIENT)
Dept: FAMILY MEDICINE | Facility: CLINIC | Age: 48
End: 2023-03-22
Payer: COMMERCIAL

## 2023-03-22 NOTE — LETTER
March 31, 2023    To  Eleazar Herrera  [address] [facility] [address]    Your team at [facility] cares about your health. We have reviewed your chart and based on our findings; we are making the following recommendations to better manage your health.     You are in particular need of attention regarding the following:     Call or MyChart message your clinic to schedule a colonoscopy, schedule/ a FIT Test, or order a Cologuard test. If you are unsure what type of test you need, please call your clinic and speak to clinic staff.   Colon cancer is now the second leading cause of cancer-related deaths in the [place] for both men and women and there are over 130,000 new cases and 50,000 deaths per year from colon cancer. Colonoscopies can prevent 90-95% of these deaths. Problem lesions can be removed before they ever become cancer. This test is not only looking for cancer, but also getting rid of precancerous lesions.   If you are under/uninsured, we recommend you contact the Peer5 Program.Peer5 is a free colorectal cancer screening program that provides colonoscopies for eligible under/uninsured [place] men and women. If you are interested in receiving a free colonoscopy, please call Peer5 at t [phone] (mention code [employer]) to see if you're eligible. Please have them send us the results.   Please call [phone] to schedule a colonoscopy.  PREVENTATIVE VISIT: Physical    If you have already completed these items, please contact the clinic via phone or   Dataslidehart so your care team can review and update your records. Thank you for   choosing [facility] for your healthcare needs. For any questions,   concerns, or to schedule an appointment please contact our clinic.    Healthy Regards,      Your [facility] Care Team

## 2023-03-22 NOTE — TELEPHONE ENCOUNTER
Patient Quality Outreach    Patient is due for the following:   Colon Cancer Screening  Physical Preventive Adult Physical    Next Steps:   Schedule a Adult Preventative    Type of outreach:    Sent ididwork message.    Next Steps:  Reach out within 90 days via Phone.    Max number of attempts reached: No. Will try again in 90 days if patient still on fail list.    Questions for provider review:    None     Ronel Moreira, [facility]  Chart routed to Care Team.

## 2023-03-31 NOTE — TELEPHONE ENCOUNTER
Patient Quality Outreach    Patient is due for the following:   Colon Cancer Screening  Physical Preventive Adult Physical    Next Steps:   patient has not reviewed my chart    Type of outreach:    Sent letter.    Next Steps:  Reach out within 90 days via Phone.    Max number of attempts reached: Yes. Will try again in 90 days if patient still on fail list.    Questions for provider review:    None     Ronel Moreira, [facility]  Chart routed to chart closed.

## 2023-04-02 ENCOUNTER — HEALTH MAINTENANCE LETTER (OUTPATIENT)
Age: 48
End: 2023-04-02

## 2023-04-05 ENCOUNTER — OFFICE VISIT (OUTPATIENT)
Dept: DERMATOLOGY | Facility: CLINIC | Age: 48
End: 2023-04-05
Attending: FAMILY MEDICINE
Payer: COMMERCIAL

## 2023-04-05 DIAGNOSIS — R21 FACIAL RASH: ICD-10-CM

## 2023-04-05 DIAGNOSIS — K13.0 CHAPPED LIPS: ICD-10-CM

## 2023-04-05 DIAGNOSIS — L24.9 IRRITANT DERMATITIS: Primary | ICD-10-CM

## 2023-04-05 PROCEDURE — 99203 OFFICE O/P NEW LOW 30 MIN: CPT | Performed by: PHYSICIAN ASSISTANT

## 2023-04-05 RX ORDER — DIAPER,BRIEF,INFANT-TODD,DISP
EACH MISCELLANEOUS 2 TIMES DAILY
COMMUNITY
End: 2023-10-02

## 2023-04-05 RX ORDER — FLUOCINONIDE 0.5 MG/G
OINTMENT TOPICAL
Qty: 15 G | Refills: 3 | Status: SHIPPED | OUTPATIENT
Start: 2023-04-05 | End: 2023-07-25

## 2023-04-05 NOTE — LETTER
2023         RE: Eleazar Herrera  [address] [facility] [address]        Dear Colleague,    Thank you for referring your patient, Eleazar Herrera, to the [facility]. Please see a copy of my visit note below.    Eleazar Herrera is an extremely pleasant 48 year old year old male patient here today for lip rash. He notes present for one year. He has tried numerous lip balms including carmex and kang bees. He notes he has had some improvements with vaseline and clotrimazole he notes he has never had resolution. He also has tried nysatin-triamcinolone cream with little improvements. He notes he licks his lips and will peel away dry lizz.   Patient has no other skin complaints today.  Remainder of the HPI, Meds, PMH, Allergies, FH, and SH was reviewed in chart.    Past Medical History:   Diagnosis Date     Change in hearing 2018     Family history of Parkinson's disease 2011     Lumbago 2011     Parkinson genetic tsting 2015    Next generation sequencing from 4/4/15    TEST REQUESTED:  Parkinson disease.  Next generation sequencing of:  QRT89J8, AT, CHMP2B, COMT, DCTN1, DNAJC6, EIF4G1, FBXO7, GBA,  GIGYF2, GRN, HTRA2, LRRK2, MAPT, NR4A2, PARK2, PARK7, PINK1, PLA2G6,  SNCA, SNCAIP, SNCB, UCHL1, VPS35       Snores 2018     Tremor      Tremor 10/31/2012       Past Surgical History:   Procedure Laterality Date     DISCECTOMY LUMBAR POSTERIOR MICROSCOPIC ONE LEVEL  2013    Procedure: DISCECTOMY LUMBAR POSTERIOR MICROSCOPIC ONE LEVEL;  Left Lumbar 5-Sacral 1 Microdiscectomy ;  Surgeon: James Curran MD;  Location: [facility] OR     epidural injection      back problem prior to  surgery and past surgery     SINUS SURGERY       TONSILLECTOMY  1998     VASECTOMY  2015        Family History   Problem Relation Age of Onset     Parkinsonism Mother          78     Restless Leg Syndrome Father      Lung Cancer Father       Bone Cancer Father      Cerebrovascular Disease Father      Other - See Comments Sister         maggi watkins     Other - See Comments Sister         maty watkins     Other - See Comments Sister         salvador watkins     Diabetes Sister         type i     Other - See Comments Sister         maggi watkins     Tremor Brother      Other - See Comments Brother         blanca ast[place]a - preston rodriguez     Other - See Comments Maternal Grandmother      Parkinsonism Maternal Grandfather      Other - See Comments Paternal Grandmother      Other - See Comments Paternal Grandfather      Other - See Comments Son         living with him     Other - See Comments Maternal Uncle      Tremor Maternal Uncle         aidan watkins     Other - See Comments Paternal Uncle      C.A.D. No family hx of      Cancer No family hx of        Social History     Socioeconomic History     Marital status:      Spouse name: Not on file     Number of children: 1     Years of education: Not on file     Highest education level: Not on file   Occupational History     Occupation: property management/ real estate   Tobacco Use     Smoking status: Former     Packs/day: 0.00     Types: Cigarettes     Smokeless tobacco: Never   Vaping Use     Vaping status: Never Used   Substance and Sexual Activity     Alcohol use: Yes     Comment: 3drinks per day 21 per week     Drug use: No     Sexual activity: Yes     Partners: Female   Other Topics Concern     Parent/sibling w/ CABG, MI or angioplasty before 65F 55M? 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 He is living in [facility]    Single    Has a house mate; who lives upstairs    No dependents.    Broke up with girl friend - after 6 yrs.    She had 2 kids: 12 yrs and 9 yrs    No smoking    Social alcohol.            Mother lives in [place], [place] -- near  [place]    She has parkinson and is hallucinating on mirapex    She is not driving and was born in 1939 and is 74 yrs old.            Family history of parkinson in his mother with onset of her symptoms in her late 60s and she is 73 yrs old now.        Maternal grandfather had parkinson as well and passed away in his 80s.        2015    He lives in [place] and he has a fiance who is pregnant at this time with their child.     She is a heads of commerce for [employer]     Financial Resource Strain: Not on file   Food Insecurity: Not on file   Transportation Needs: Not on file   Physical Activity: Not on file   Stress: Not on file   Social Connections: Not on file   Intimate Partner Violence: Not on file   Housing Stability: Not on file       Outpatient Encounter Medications as of 4/5/2023   Medication Sig Dispense Refill     clotrimazole (LOTRIMIN) 1 % external cream Apply topically 2 times daily as needed (fungal rash) 30 g 1     fluocinonide (LIDEX) 0.05 % external ointment Apply twice daily affected on lips for 1-2 weeks. 15 g 3     hydrocortisone (CORTAID) 0.5 % external cream Apply topically 2 times daily       aspirin 81 MG EC tablet Take 81 mg by mouth daily (Patient not taking: Reported on 4/5/2023)       atorvastatin (LIPITOR) 10 MG tablet TAKE 1 TABLET BY MOUTH DAILY (Patient not taking: Reported on 4/5/2023) 90 tablet 1     sertraline (ZOLOFT) 50 MG tablet Take 1 tablet (50 mg) by mouth daily (Patient not taking: Reported on 4/5/2023) 90 tablet 3     No facility-administered encounter medications on file as of 4/5/2023.             O:   NAD, WDWN, Alert & Oriented, Mood & Affect wnl, Vitals stable   Here today alone   There were no vitals taken for this visit.   General appearance normal   Vitals stable   Alert, oriented and in no acute distress     Mild scaly around mouth, rash is disrupting lip line     Eyes: Conjunctivae/lids:Normal     ENT: Lips:  normal    MSK:Normal    Pulm: Breathing Dixie    Neuro/Psych: Orientation:Alert and Orientedx3 ; Mood/Affect:normal   A/P:  1. Favor irritant dermatitis   Avoid licking and removing dry skin.   Use vaseline, aquaphor or cerave healing ointment   Apply lidex ointment bid x 4-14 days.   Recheck in 3 weeks       Again, thank you for allowing me to participate in the care of your patient.        Sincerely,        Anjana Nayak PA-C

## 2023-04-06 NOTE — PROGRESS NOTES
Eleazar Herrera is an extremely pleasant 48 year old year old male patient here today for lip rash. He notes present for one year. He has tried numerous lip balms including carmex and kang bees. He notes he has had some improvements with vaseline and clotrimazole he notes he has never had resolution. He also has tried nysatin-triamcinolone cream with little improvements. He notes he licks his lips and will peel away dry lizz.   Patient has no other skin complaints today.  Remainder of the HPI, Meds, PMH, Allergies, FH, and SH was reviewed in chart.    Past Medical History:   Diagnosis Date     Change in hearing 2018     Family history of Parkinson's disease 2011     Lumbago 2011     Parkinson genetic tsting 2015    Next generation sequencing from 4/4/15    TEST REQUESTED:  Parkinson disease.  Next generation sequencing of:  [id number], AT, CHMP2B, COMT, DCTN1, DNAJC6, EIF4G1, FBXO7, GBA,  GIGYF2, GRN, HTRA2, LRRK2, MAPT, NR4A2, PARK2, PARK7, PINK1, PLA2G6,  SNCA, SNCAIP, SNCB, UCHL1, VPS35       Snores 2018     Tremor      Tremor 10/31/2012       Past Surgical History:   Procedure Laterality Date     DISCECTOMY LUMBAR POSTERIOR MICROSCOPIC ONE LEVEL  2013    Procedure: DISCECTOMY LUMBAR POSTERIOR MICROSCOPIC ONE LEVEL;  Left Lumbar 5-Sacral 1 Microdiscectomy ;  Surgeon: James Curran MD;  [facility]     epidural injection      back problem prior to  surgery and past surgery     SINUS SURGERY       TONSILLECTOMY  1998     VASECTOMY          Family History   Problem Relation Age of Onset     Parkinsonism Mother          78     Restless Leg Syndrome Father      Lung Cancer Father      Bone Cancer Father      Cerebrovascular Disease Father      Other - See Comments Sister         maggi [place]     Other - See Comments Sister         maty [place]     Other - See Comments Sister         salvador [place]     Diabetes Sister          type i     Other - See Comments Sister         maggi watkins     Tremor Brother      Other - See Comments Brother         blanca astorga - preston rodriguez     Other - See Comments Maternal Grandmother      Parkinsonism Maternal Grandfather      Other - See Comments Paternal Grandmother      Other - See Comments Paternal Grandfather      Other - See Comments Son         living with him     Other - See Comments Maternal Uncle      Tremor Maternal Uncle         aidan watkins     Other - See Comments Paternal Uncle      C.A.D. No family hx of      Cancer No family hx of        Social History     Socioeconomic History     Marital status:      Spouse name: Not on file     Number of children: 1     Years of education: Not on file     Highest education level: Not on file   Occupational History     Occupation: property management/ real estate   Tobacco Use     Smoking status: Former     Packs/day: 0.00     Types: Cigarettes     Smokeless tobacco: Never   Vaping Use     Vaping status: Never Used   Substance and Sexual Activity     Alcohol use: Yes     Comment: 3drinks per day 21 per week     Drug use: No     Sexual activity: Yes     Partners: Female   Other Topics Concern     Parent/sibling w/ CABG, MI or angioplasty before 65F 55M? 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 He is living in [facility]    Single    Has a house mate; who lives upstairs    No dependents.    Broke up with girl friend - after 6 yrs.    She had 2 kids: 12 yrs and 9 yrs    No smoking    Social alcohol.            Mother lives in [place], [place] -- near [place]    She has parkinson and is hallucinating on mirapex    She is not driving and was born in 1939 and is 74 yrs old.            Family history of parkinson in his mother with onset of her symptoms in her late 60s and she is 73 yrs old now.         Maternal grandfather had parkinson as well and passed away in his 80s.        2015    He lives in [place] and he has a fiance who is pregnant at this time with their child.     She is a heads of commerce for [employer]     Financial Resource Strain: Not on file   Food Insecurity: Not on file   Transportation Needs: Not on file   Physical Activity: Not on file   Stress: Not on file   Social Connections: Not on file   Intimate Partner Violence: Not on file   Housing Stability: Not on file       Outpatient Encounter Medications as of 4/5/2023   Medication Sig Dispense Refill     clotrimazole (LOTRIMIN) 1 % external cream Apply topically 2 times daily as needed (fungal rash) 30 g 1     fluocinonide (LIDEX) 0.05 % external ointment Apply twice daily affected on lips for 1-2 weeks. 15 g 3     hydrocortisone (CORTAID) 0.5 % external cream Apply topically 2 times daily       aspirin 81 MG EC tablet Take 81 mg by mouth daily (Patient not taking: Reported on 4/5/2023)       atorvastatin (LIPITOR) 10 MG tablet TAKE 1 TABLET BY MOUTH DAILY (Patient not taking: Reported on 4/5/2023) 90 tablet 1     sertraline (ZOLOFT) 50 MG tablet Take 1 tablet (50 mg) by mouth daily (Patient not taking: Reported on 4/5/2023) 90 tablet 3     No facility-administered encounter medications on file as of 4/5/2023.             O:   NAD, WDWN, Alert & Oriented, Mood & Affect wnl, Vitals stable   Here today alone   There were no vitals taken for this visit.   General appearance normal   Vitals stable   Alert, oriented and in no acute distress     Mild scaly around mouth, rash is disrupting lip line     Eyes: Conjunctivae/lids:Normal     ENT: Lips: normal    MSK:Normal    Pulm: Breathing Dixie    Neuro/Psych: Orientation:Alert and Orientedx3 ; Mood/Affect:normal   A/P:  1. Favor irritant dermatitis   Avoid licking and removing dry skin.   Use vaseline, aquaphor or cerave healing ointment   Apply lidex ointment  bid x 4-14 days.   Recheck in 3 weeks

## 2023-05-04 DIAGNOSIS — E78.5 HYPERLIPIDEMIA LDL GOAL <100: ICD-10-CM

## 2023-05-04 RX ORDER — ATORVASTATIN CALCIUM 10 MG/1
TABLET, FILM COATED ORAL
Qty: 90 TABLET | Refills: 1 | Status: SHIPPED | OUTPATIENT
Start: 2023-05-04 | End: 2024-05-31

## 2023-07-25 DIAGNOSIS — L24.9 IRRITANT DERMATITIS: ICD-10-CM

## 2023-07-25 RX ORDER — FLUOCINONIDE 0.5 MG/G
OINTMENT TOPICAL
Qty: 15 G | Refills: 0 | Status: SHIPPED | OUTPATIENT
Start: 2023-07-25

## 2023-07-25 NOTE — TELEPHONE ENCOUNTER
"Last Written Prescription Date:  04/05/23  Last Fill Quantity: 15g,  # refills: 3   Last office visit: 4/5/2023 ; last virtual visit: Visit date not found with prescribing provider:  04/05/23   Future Office Visit:            Requested Prescriptions   Pending Prescriptions Disp Refills    fluocinonide (LIDEX) 0.05 % external ointment 15 g 3     Sig: Apply twice daily affected on lips for 1-2 weeks.       Topical Steroids and Nonsteroidals Protocol Failed - 7/25/2023 11:09 AM        Failed - High potency steroid not ordered        Passed - Patient is age 6 or older        Passed - Authorizing prescriber's most recent note related to this medication read.     If refill request is for ophthalmic use, please forward request to provider for approval.          Passed - Recent (12 mo) or future (30 days) visit within the authorizing provider's specialty     Patient has had an office visit with the authorizing provider or a provider within the authorizing providers department within the previous 12 mos or has a future within next 30 days. See \"Patient Info\" tab in inbasket, or \"Choose Columns\" in Meds & Orders section of the refill encounter.              Passed - Medication is active on med list             "

## 2023-07-25 NOTE — TELEPHONE ENCOUNTER
Has pt used all three refills from previous prescription in April? Please check on how often he is using this.

## 2023-07-25 NOTE — TELEPHONE ENCOUNTER
Patient Contact    Attempt # 1    Was call answered?  No.  Received message that voicemail box has not been set up yet and not able to leave a message.  Will try calling back.    Kaylyn CARRERO RN  [facility] [place]  [phone]

## 2023-07-25 NOTE — TELEPHONE ENCOUNTER
[facility]    Phone Message    May a detailed message be left on voicemail: yes     Reason for Call: Medication Refill Request    Has the patient contacted the pharmacy for the refill? Yes   Name of medication being requested: fluocinonide (LIDEX) 0.05 % external ointment [8609]    Provider who prescribed the medication: Anjana Garcia PA-C  Pharmacy: [id number] - [place], [place] - [address] AT [facility] [address]  Date medication is needed: 7/25/23       Action Taken: Message routed to:  [facility] DERM    Travel Screening: Not Applicable

## 2023-07-26 NOTE — TELEPHONE ENCOUNTER
"Called and spoke to patient. He states he was not aware he had refills at the pharmacy. Has only used one tube of medication. Patient states he is going to see how it goes with this refill and try using it as directed as he feels he was not using enough of it because he didn't  \"want goop\" on his face at work.   Patient states he will call to be seen in office if this refill doesn't work for him.    I advised patient to reach out to us or send a mychart if he has any questions or concerns  Patient expressed understanding.       Lashae SALINAS RN BSN  [facility]  [phone]    "

## 2023-09-15 ENCOUNTER — MYC MEDICAL ADVICE (OUTPATIENT)
Dept: FAMILY MEDICINE | Facility: CLINIC | Age: 48
End: 2023-09-15
Payer: COMMERCIAL

## 2023-09-15 DIAGNOSIS — F43.21 ADJUSTMENT DISORDER WITH DEPRESSED MOOD: Primary | ICD-10-CM

## 2023-10-02 ENCOUNTER — OFFICE VISIT (OUTPATIENT)
Dept: FAMILY MEDICINE | Facility: CLINIC | Age: 48
End: 2023-10-02
Payer: COMMERCIAL

## 2023-10-02 VITALS
WEIGHT: 229.38 LBS | HEART RATE: 87 BPM | HEIGHT: 71 IN | TEMPERATURE: 97.8 F | RESPIRATION RATE: 20 BRPM | SYSTOLIC BLOOD PRESSURE: 133 MMHG | DIASTOLIC BLOOD PRESSURE: 83 MMHG | BODY MASS INDEX: 32.11 KG/M2

## 2023-10-02 DIAGNOSIS — Z23 ENCOUNTER FOR IMMUNIZATION: ICD-10-CM

## 2023-10-02 DIAGNOSIS — F41.9 ANXIETY: Primary | ICD-10-CM

## 2023-10-02 DIAGNOSIS — R25.1 TREMOR: ICD-10-CM

## 2023-10-02 PROCEDURE — 90686 IIV4 VACC NO PRSV 0.5 ML IM: CPT | Performed by: FAMILY MEDICINE

## 2023-10-02 PROCEDURE — 90471 IMMUNIZATION ADMIN: CPT | Performed by: FAMILY MEDICINE

## 2023-10-02 PROCEDURE — 99213 OFFICE O/P EST LOW 20 MIN: CPT | Mod: 25 | Performed by: FAMILY MEDICINE

## 2023-10-02 PROCEDURE — 96127 BRIEF EMOTIONAL/BEHAV ASSMT: CPT | Performed by: FAMILY MEDICINE

## 2023-10-02 RX ORDER — CITALOPRAM HYDROBROMIDE 10 MG/1
10 TABLET ORAL DAILY
Qty: 30 TABLET | Refills: 1 | Status: SHIPPED | OUTPATIENT
Start: 2023-10-02 | End: 2023-12-06

## 2023-10-02 ASSESSMENT — ANXIETY QUESTIONNAIRES
2. NOT BEING ABLE TO STOP OR CONTROL WORRYING: SEVERAL DAYS
6. BECOMING EASILY ANNOYED OR IRRITABLE: SEVERAL DAYS
7. FEELING AFRAID AS IF SOMETHING AWFUL MIGHT HAPPEN: NOT AT ALL
4. TROUBLE RELAXING: NOT AT ALL
5. BEING SO RESTLESS THAT IT IS HARD TO SIT STILL: NOT AT ALL
GAD7 TOTAL SCORE: 5
1. FEELING NERVOUS, ANXIOUS, OR ON EDGE: SEVERAL DAYS
IF YOU CHECKED OFF ANY PROBLEMS ON THIS QUESTIONNAIRE, HOW DIFFICULT HAVE THESE PROBLEMS MADE IT FOR YOU TO DO YOUR WORK, TAKE CARE OF THINGS AT HOME, OR GET ALONG WITH OTHER PEOPLE: SOMEWHAT DIFFICULT
3. WORRYING TOO MUCH ABOUT DIFFERENT THINGS: MORE THAN HALF THE DAYS
GAD7 TOTAL SCORE: 5

## 2023-10-02 ASSESSMENT — PATIENT HEALTH QUESTIONNAIRE - PHQ9
SUM OF ALL RESPONSES TO PHQ QUESTIONS 1-9: 3
SUM OF ALL RESPONSES TO PHQ QUESTIONS 1-9: 3
10. IF YOU CHECKED OFF ANY PROBLEMS, HOW DIFFICULT HAVE THESE PROBLEMS MADE IT FOR YOU TO DO YOUR WORK, TAKE CARE OF THINGS AT HOME, OR GET ALONG WITH OTHER PEOPLE: SOMEWHAT DIFFICULT

## 2023-10-02 ASSESSMENT — PAIN SCALES - GENERAL: PAINLEVEL: NO PAIN (0)

## 2023-10-02 ASSESSMENT — ENCOUNTER SYMPTOMS: NERVOUS/ANXIOUS: 1

## 2023-10-02 NOTE — PROGRESS NOTES
Subjective   Eleazar is a 48 year old, presenting for the following health issues:  Anxiety        10/2/2023     8:33 AM   Additional Questions   Roomed by Ronel Moreira       History of Present Illness       Mental Health Follow-up:  Patient presents to follow-up on Depression & Anxiety.Patient's depression since last visit has been:  Medium  The patient is not having other symptoms associated with depression.  Patient's anxiety since last visit has been:  Better  The patient is having other symptoms associated with anxiety.  Any significant life events: relationship concerns, job concerns and health concerns  Patient is feeling anxious or having panic attacks.  Patient has concerns about alcohol or drug use.    He eats 0-1 servings of fruits and vegetables daily.He consumes 1 sweetened beverage(s) daily.He exercises with enough effort to increase his heart rate 9 or less minutes per day.  He exercises with enough effort to increase his heart rate 3 or less days per week. He is missing 2 dose(s) of medications per week.  He is not taking prescribed medications regularly due to remembering to take.             Review of Systems   Psychiatric/Behavioral:  The patient is nervous/anxious.       Patient wants to change meds    Current med worked for a long time, but then not  Actually maybe making thing worse?    Went off about 1 1/2 months ago    Felt okay the first 2-3 weeks  No withdrawal    Anxiety still significant    Lots of stressors    Works in real estate    Has a counselor set up     Has prescription for propranolol to use as needed but not using currently    Has tremor    Has seen neurology     No parkinsons dx    Usually sees neurology yearly     Not much exercise    Working on house    Both anxious and depressed    Energy level okay    No sig wt change    Significant family history of depression / anxiety            Objective    /83 (BP Location: Right arm, Patient Position: Chair, Cuff Size:  "Adult Large)   Pulse 87   Temp 97.8  F (36.6  C) (Temporal)   Resp 20   Ht 1.814 m (5' 11.42\")   Wt 104 kg (229 lb 6 oz)   BMI 31.62 kg/m    Body mass index is 31.62 kg/m .  Physical Exam    Full physical not done     Mentation fine    Affect okay here    Patient has a little tremor when holding hands out in front     Reviewed gad7 and phq9 in detail      ASSESSMENT / PLAN:  (F41.9) Anxiety  (primary encounter diagnosis)  Comment: trial of different ssri.  Discussed in detail.  Do phone visit in 1-2 months.   Plan: citalopram (CELEXA) 10 MG tablet             (Z23) Encounter for immunization  Comment: need flu shot   Plan: INFLUENZA VACCINE IM > 6 MONTHS VALENT IIV4         (AFLURIA/FLUZONE), SCREENING QUESTIONS FOR         ADULT IMMUNIZATIONS, CANCELED: COVID-19 12+         (2023-24) (PFIZER)             (R25.1) Tremor  Comment: patient plans on seeing neurology again soon; he will schedule.  Not on med currently   Plan: as above      I reviewed the patient's medications, allergies, medical history, family history, and social history.    Scot Raygoza MD                       "

## 2023-10-02 NOTE — PATIENT INSTRUCTIONS
Start citalopram    Increase exercise    Do phone visit in 1 1/2 to 2 months; call sooner if needed for side effects etc     Schedule with neurology

## 2023-10-05 ENCOUNTER — VIRTUAL VISIT (OUTPATIENT)
Dept: PSYCHOLOGY | Facility: CLINIC | Age: 48
End: 2023-10-05
Payer: COMMERCIAL

## 2023-10-05 DIAGNOSIS — F43.23 ADJUSTMENT DISORDER WITH MIXED ANXIETY AND DEPRESSED MOOD: Primary | ICD-10-CM

## 2023-10-05 PROCEDURE — 90791 PSYCH DIAGNOSTIC EVALUATION: CPT | Mod: VID

## 2023-10-05 NOTE — PROGRESS NOTES
HEALTH PSYCHOLOGY OUTPATIENT PSYCHODIAGNOSTIC ASSESSMENT     Health Psychology Office   Health Psychology Clinic   Department of Medicine   [facility]  [address] [place] [address] Clinics and Surgery Center  3rd Floor  [address] [place] [address]     Health Psychology Team:  Clari Eagle, Ph.D., L.P [phone]  Janis Saldaña, Ph.D., L.P. [phone]  Db Conde, Ph.D. [phone]  Effie Chang, Ph.D., A.B.P.P., L.P. [phone]  Javier Fajardo, Ph.D., A.B.P.P., L.P. [phone]         Leonora Salinas, Ph.D., L.P. [phone]   Sommer Norris, Ph.D., A.B.P.P., L.P. [phone]  Link Choi, Ph.D. (fellow) [phone]    TELEHEALTH STATEMENT:   The patient has been notified that this video visit will be conducted via a call between you and your physician/provider. We have found that certain health care needs can be provided without the need for an in-person physical exam. This service lets us provide the care you need with a video conversation. If a prescription is necessary we can send it directly to your pharmacy. If lab work is needed we can place an order for that and you can then stop by our lab to have the test done at a later time. If during the course of the call the physician/provider feels a video visit is not appropriate, you will not be charged for this service.      Patient has given verbal consent for Video visit.  Mode of transmission: Secure real time interactive audio and visual telecommunication system via [facility].    Location of originating and distant sites:  Originating site (patient location): Patient's home in [place]  [place] [facility] [place] [facility] Health Psychology Office    CONFIDENTIAL SUMMARY OF HEALTH PSYCHOLOGY EVALUATION:  Patient was provided information about Health Psychology Services, including billing and limits to confidentiality and documentation in electronic medical records.  Provided opportunity to ask questions and obtained verbal consent for treatment.    Referral Source/Reason:  Mr. Herrera was referred to Health Psychology by Mercedes Day  for coping with depression.     Patient Demographics (per chart):   Age: 48 year old  Biological Sex: male  Race: White  Ethnicity: Not  or     Medical History (per chart):  Patient Active Problem List   Diagnosis    Lumbago    Family history of Parkinson's disease    Hyperlipidemia LDL goal <130    Tremor    Lumbosacral disc herniation    Anxiety    Change in hearing    Snores    Sleep disorder    Anxiety state    Lipoma    Parkinson genetic tsting 2015      Past Medical History:   Diagnosis Date    Change in hearing 2018    Family history of Parkinson's disease 2011    Lumbago 2011    Parkinson genetic tsting 2015    Next generation sequencing from 4/4/15    TEST REQUESTED:  Parkinson disease.  Next generation sequencing of:  OXU15V6, AT, CHMP2B, COMT, DCTN1, DNAJC6, EIF4G1, FBXO7, GBA,  GIGYF2, GRN, HTRA2, LRRK2, MAPT, NR4A2, PARK2, PARK7, PINK1, PLA2G6,  SNCA, SNCAIP, SNCB, UCHL1, VPS35      Snores 2018    Tremor     Tremor 10/31/2012     Past Surgical History:   Procedure Laterality Date    DISCECTOMY LUMBAR POSTERIOR MICROSCOPIC ONE LEVEL  2013    Procedure: DISCECTOMY LUMBAR POSTERIOR MICROSCOPIC ONE LEVEL;  Left Lumbar 5-Sacral 1 Microdiscectomy ;  Surgeon: James Curran MD;  Location: [facility] OR    epidural injection      back problem prior to  surgery and past surgery    SINUS SURGERY      TONSILLECTOMY      VASECTOMY  2015     Current Outpatient Medications   Medication Sig Dispense Refill    aspirin 81 MG EC tablet Take 81 mg by mouth daily      atorvastatin (LIPITOR) 10 MG tablet TAKE 1 TABLET BY MOUTH DAILY 90 tablet 1    citalopram (CELEXA) 10 MG tablet Take 1 tablet (10 mg) by mouth daily 30 tablet 1    clotrimazole (LOTRIMIN) 1 % external  "cream Apply topically 2 times daily as needed (fungal rash) 30 g 1    fluocinonide (LIDEX) 0.05 % external ointment Apply twice daily affected on lips for 1-2 weeks. 15 g 0      Current Outpatient Medications   Medication    aspirin 81 MG EC tablet    atorvastatin (LIPITOR) 10 MG tablet    citalopram (CELEXA) 10 MG tablet    clotrimazole (LOTRIMIN) 1 % external cream    fluocinonide (LIDEX) 0.05 % external ointment     No current facility-administered medications for this visit.     Wt Readings from Last 4 Encounters:   10/02/23 104 kg (229 lb 6 oz)   11/08/22 101.7 kg (224 lb 2 oz)   07/12/22 99.8 kg (220 lb)   02/25/22 101.2 kg (223 lb 3.2 oz)     Estimated body mass index is 31.62 kg/m  as calculated from the following:    Height as of 10/2/23: 1.814 m (5' 11.42\").    Weight as of 10/2/23: 104 kg (229 lb 6 oz).    Patient's treatment team at the [facility] is:   Patient Care Team         Relationship Specialty Notifications Start End    Scot Raygoza MD PCP - General   [birth year]09     Phone: [phone] Fax: [phone] [address] [facility] [address]    James Curran MD MD Specialist  3/31/15     Phone: [phone] Fax: [phone]         [facility] [address] [place] [address]    Scot Raygoza MD Assigned PCP   12/27/20     Phone: [phone] Fax: [phone] [address] [facility] [address]    Nikolai Arce MD Assigned Neuroscience Provider   7/23/22     Phone: [phone] Fax: [phone]         [address] [facility] [address]    Trang Joy [facility] Assigned MTM Pharmacist   9/28/22     Phone: [phone] Pager: [phone] [address] [facility] [address]    Scot Raygoza MD Referring Physician Family Medicine  11/8/22     referred to derm    Phone: [phone] Fax: [phone] [address] [facility] [address]    Anjana Garcia PA-C Physician Assistant " Dermatology  22     Phone: [phone] Fax: [phone] [address] [facility] [address]            History of Presenting Concerns:  Mr. Herrera is a 48-year-old white male who presents with depression symptoms related to family history of Parkinson's disease.  Patient stated that he has personally had tremors since childhood though he does not have a current diagnosis of Parkinson's disease.  Patient stated that both his grandfather and mother passed away from Parkinson's disease and that he is worried about increasing symptoms and ultimately being unable to be there for his son growing up.    Social History:  Current Living Arrangement: Pt reported that he currently owns 2 houses with his wife.  He stated that he and his wife were neighbors prior to being  and decided to keep both of their houses after becoming .  Patient stated that he spends most of the day at home with his wife and son but that he sleeps in the other house at night.  Patient stated this was due to ongoing marital conflict.    Relationship Status/Family: Pt reported that he has been  to his wife for 7 years.  Patient reported that he has one 8-year-old son with his wife.  Patient stated that he has 4 sisters and 1 brother.  Patient stated that both parents are .    Social Support: Pt reported a social support [place] including his sisters and a few close friends.   Abuse/Trauma History : Pt denied a hx of abuse or trauma.    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   Educational History: Pt reported that he has 5 years of collegiate education but that he does not have any collegiate degrees.  Patient reported that he graduated high school.     Health Behaviors:  Caffeine Use: Pt reported having three 8 ounce cups of coffee every day.   ETOH Use: Pt reported ETOH use.  Patient reported high level of alcohol use during the COVID-19 pandemic where he reported drinking approximately  6 to 7 beers a night.  Patient reported in the past several months making a concerted effort to decrease alcohol consumption and that he currently has approximately 1 beer a night.  Patient denied alcohol use treatment history.  Patient reported finding increased alcohol consumption during the COVID-19 pandemic as problematic and worrisome and has made concerted efforts to decrease.   Substance Use: Pt denied substance use.   Tobacco Use : Pt denied tobacco use.    Exercise/Physical Activity:  Pt reported that he engages in manual labor around his house every weekend.   Leisure Activities: Pt reported that he likes to work on his jeep.   Sleep: Pt denied difficulties with sleep quality.    Medication Adherence: Pt denied difficulties with medication adherence.      Psychiatric History:  Symptoms of Depression: Patient reported feeling down and depressed most days.   Symptoms of Anxiety: Pt denied anxiety symptoms       Patient denied sxs associated with panic, obsessive-compulsive disorder, or agoraphobia.    Symptoms of Trauma: Pt denied symptoms related to a trauma.   Symptoms of Attention: Pt denied hx of/current attention-related symptoms.   Symptoms of Psychosis:  Pt denied hx of/current psychotic symptoms.   Symptoms of Marcela: Pt denied hx of/current manic symptoms.   Symptoms of Disordered Eating: Pt denied hx of/current disordered eating symptoms.   History of self-harm or suicide attempts: Patient reported a history of suicidal ideation without suicidal plan, intent, or attempt.  Patient reported suicidal ideation while in high school in connection to depressive episode at time.  Patient reported psychiatric hospitalization for this ideation.  Patient denied current suicidal ideation.  Patient denied history of or current self-harm.   History of Mental Health Treatment: Pt reported hx of/current mental health treatment.  Patient reported seeing a mental health provider approximately 2 years ago but found  that it was a bad fit for him.  Patient stated he received the impression that the provider did not work with depressive symptoms.    History of Psychiatric Hospitalization:  Pt reported hx of psychiatric hospitalization.  Patient reported 2 instances of psychiatric hospitalization.  Patient reported in high school experiencing suicidal ideation and voluntarily becoming hospitalized for 7 days.  Patient stated following hospitalization a romantic partner  by suicide prompting him to enter psychiatric hospitalization for another 7 days.  Patient was also on high school during this time.      Self-Report Psychological Assessment:  Anxiety  The EMMIE-7 is a measure of anxiety and panic symptoms.  Scores on this measure range from 0 to 21 with higher scores reflecting greater levels and frequency of anxiety symptoms.  The patient's most recent score on the EMMIE-7 was:      10/2/2023     8:09 AM   EMMIE-7 SCORE   Total Score 5 (mild anxiety)   Total Score 5       Depression  The PHQ-9 is a measure of depressive symptoms.  Scores on this measure range from 0 to 27 with higher scores reflecting greater levels and frequency of depressive symptoms.    The patient's most recent score/s on the PHQ-9 was/were:      2022     8:37 AM 10/2/2023     8:05 AM   PHQ-9 SCORE   PHQ-9 Total Score MyChart  3 (Minimal depression)   PHQ-9 Total Score 3 3       Alcohol and Drug abuse  Data is unavailable at this time.  Will addend note when available/will provide self-report results at next appointment.    Global Health  Data is unavailable at this time.  Will addend note when available/will provide self-report results at next appointment.    Mental Status/Interview:  Appearance: Appropriate   Eye Contact: Good    Orientation: Yes, x4  Behavior/relationship to provider/demeanor: Engaged  Motor Activity: normal or unremarkable  Mood (subjective report): Normal  Affect (objective appearance): Appropriate  Speech Rate: Normal  Speech  Volume: Normal  Speech Articulation: Normal  Speech Coherence: Normal  Speech Spontaneity: Normal  Thought Process/Content: Clear, logical and linear, coherent and goal directed. There was no evidence of delusions, paranoia, or visual/auditory hallucinations. The patient tracked the conversation well.  Behavior during interview suggested that patient's memory functioning is intact for both remote and immediate recall.  Abnormal Perception: None  Attention/Concentration: Normal  Memory: Appears grossly intact   Fund of Knowledge: Appears within normal limits   Abstraction:  Normal  Insight:  Good  Judgment:  good  Suicidal ideation: Pt denied active suicidal ideation.   Homicidal ideation: Pt denied active homicidal ideation.     Impression:  Based on this interview and results from the assessments administered on this date, Mr. Herrera appears to be experiencing symptoms of depression that appear to be related to family history of Parkinson's disease. Currently, the patient appears to be coping with some success.  According to self-report data patient appears to experience depressive symptoms in the minimal range. This patient has the following strengths: Engagement in healthcare, social support.  Patient engages in the following healthy behaviors: Regular physical activity, sleep hygiene, abstinence from illicit substances. Patient would benefit from CBT for depression.     Diagnosis:  Adjustment disorder with mixed anxious and depressed mood.     CPT Code:  [facility] PSYCHIATRIC DIAGNOSTIC EVALUATION [5423872]    Date of Service:  10/05/23    Session Length:  Start Time: 11:00am  End Time: 11:48am    Recommendations/Plan:  Return for therapy sessions. Pt will return for follow on 10/20/23    Link Choi, PhD  Clinical Health Psychology Fellow    This case is being supervised by Clari Eagle, PhD, LP.    This note was completed using Dragon voice recognition software. Although reviewed after completion, some word and  grammatical errors may occur.    *In accordance with the Rules of the [place] Board of [employer], it is noted that psychological descriptions and scientific procedures underlying psychological evaluations have limitations.  Absolute predictions cannot be made based on information in this report.

## 2023-12-06 DIAGNOSIS — F41.9 ANXIETY: ICD-10-CM

## 2023-12-06 RX ORDER — CITALOPRAM HYDROBROMIDE 10 MG/1
10 TABLET ORAL DAILY
Qty: 90 TABLET | Refills: 1 | Status: SHIPPED | OUTPATIENT
Start: 2023-12-06 | End: 2024-05-13

## 2024-02-08 ENCOUNTER — DOCUMENTATION ONLY (OUTPATIENT)
Dept: FAMILY MEDICINE | Facility: CLINIC | Age: 49
End: 2024-02-08

## 2024-02-08 ENCOUNTER — TELEPHONE (OUTPATIENT)
Dept: FAMILY MEDICINE | Facility: CLINIC | Age: 49
End: 2024-02-08

## 2024-02-08 ENCOUNTER — LAB (OUTPATIENT)
Dept: LAB | Facility: CLINIC | Age: 49
End: 2024-02-08
Payer: COMMERCIAL

## 2024-02-08 ENCOUNTER — APPOINTMENT (OUTPATIENT)
Dept: LAB | Facility: CLINIC | Age: 49
End: 2024-02-08
Payer: COMMERCIAL

## 2024-02-08 DIAGNOSIS — E78.5 HYPERLIPIDEMIA LDL GOAL <130: Primary | ICD-10-CM

## 2024-02-08 DIAGNOSIS — E78.5 HYPERLIPIDEMIA LDL GOAL <100: ICD-10-CM

## 2024-02-08 PROCEDURE — 80061 LIPID PANEL: CPT

## 2024-02-08 PROCEDURE — 84460 ALANINE AMINO (ALT) (SGPT): CPT

## 2024-02-08 PROCEDURE — 82550 ASSAY OF CK (CPK): CPT

## 2024-02-08 PROCEDURE — 36415 COLL VENOUS BLD VENIPUNCTURE: CPT

## 2024-02-08 NOTE — TELEPHONE ENCOUNTER
Expiration dates on the lab orders have been extended.    Called and notified patient of this.  He stated that he is no longer at the lab as he has another appointment to attend now.  He asked to schedule another lab appointment for this afternoon.  He denies stating that he is feeling dizzy and insisted that he can fast til this afternoon.    Lab appointment scheduled for 3:00 PM this afternoon at [facility]    Ender Sotelo RN  [facility]

## 2024-02-08 NOTE — TELEPHONE ENCOUNTER
Dr. Raygoza,    Patient had lab appt today for lipid check but lab would not collect blood due to lipid order .     Please sign new orders.     Patient has been fasting for over 12 hours now, and he is starting to feel dizzy and would like to get this done so he can eat something.     Cued all future labs that .     Thanks,  CHRIS Jeffery  [facility]

## 2024-02-09 LAB
ALT SERPL W P-5'-P-CCNC: 48 U/L (ref 0–70)
CHOLEST SERPL-MCNC: 202 MG/DL
CK SERPL-CCNC: 82 U/L (ref 39–308)
FASTING STATUS PATIENT QL REPORTED: YES
HDLC SERPL-MCNC: 63 MG/DL
LDLC SERPL CALC-MCNC: 113 MG/DL
NONHDLC SERPL-MCNC: 139 MG/DL
TRIGL SERPL-MCNC: 132 MG/DL

## 2024-02-09 NOTE — RESULT ENCOUNTER NOTE
Cholesterol much better than last time.  I am not sure if you are taking the prescription cholesterol medication or not.  If so, let us know and we can send in refills.  If not, just keep working on healthy diet/exercise ( keep doing that even if on medication ).    Take care    Scot Raygoza MD

## 2024-05-11 DIAGNOSIS — F41.9 ANXIETY: ICD-10-CM

## 2024-05-13 RX ORDER — CITALOPRAM HYDROBROMIDE 10 MG/1
10 TABLET ORAL DAILY
Qty: 90 TABLET | Refills: 0 | Status: SHIPPED | OUTPATIENT
Start: 2024-05-13

## 2024-06-08 ENCOUNTER — HEALTH MAINTENANCE LETTER (OUTPATIENT)
Age: 49
End: 2024-06-08

## 2024-08-26 ENCOUNTER — TRANSFERRED RECORDS (OUTPATIENT)
Dept: HEALTH INFORMATION MANAGEMENT | Facility: CLINIC | Age: 49
End: 2024-08-26
Payer: COMMERCIAL

## 2025-03-05 ENCOUNTER — LAB (OUTPATIENT)
Dept: LAB | Facility: CLINIC | Age: 50
End: 2025-03-05
Payer: COMMERCIAL

## 2025-03-05 DIAGNOSIS — Z12.5 SCREENING FOR PROSTATE CANCER: ICD-10-CM

## 2025-03-05 DIAGNOSIS — E78.5 HYPERLIPIDEMIA LDL GOAL <100: ICD-10-CM

## 2025-03-05 DIAGNOSIS — Z13.1 SCREENING FOR DIABETES MELLITUS: ICD-10-CM

## 2025-03-05 LAB
ALBUMIN SERPL BCG-MCNC: 4.6 G/DL (ref 3.5–5.2)
ALP SERPL-CCNC: 87 U/L (ref 40–150)
ALT SERPL W P-5'-P-CCNC: 57 U/L (ref 0–70)
ANION GAP SERPL CALCULATED.3IONS-SCNC: 10 MMOL/L (ref 7–15)
AST SERPL W P-5'-P-CCNC: 36 U/L (ref 0–45)
BILIRUB SERPL-MCNC: 0.7 MG/DL
BUN SERPL-MCNC: 22.3 MG/DL (ref 6–20)
CALCIUM SERPL-MCNC: 9.9 MG/DL (ref 8.8–10.4)
CHLORIDE SERPL-SCNC: 106 MMOL/L (ref 98–107)
CHOLEST SERPL-MCNC: 218 MG/DL
CREAT SERPL-MCNC: 1.02 MG/DL (ref 0.67–1.17)
EGFRCR SERPLBLD CKD-EPI 2021: 90 ML/MIN/1.73M2
EST. AVERAGE GLUCOSE BLD GHB EST-MCNC: 97 MG/DL
FASTING STATUS PATIENT QL REPORTED: ABNORMAL
FASTING STATUS PATIENT QL REPORTED: ABNORMAL
GLUCOSE SERPL-MCNC: 90 MG/DL (ref 70–99)
HBA1C MFR BLD: 5 % (ref 0–5.6)
HCO3 SERPL-SCNC: 23 MMOL/L (ref 22–29)
HDLC SERPL-MCNC: 59 MG/DL
LDLC SERPL CALC-MCNC: 133 MG/DL
NONHDLC SERPL-MCNC: 159 MG/DL
POTASSIUM SERPL-SCNC: 4.4 MMOL/L (ref 3.4–5.3)
PROT SERPL-MCNC: 7.3 G/DL (ref 6.4–8.3)
PSA SERPL DL<=0.01 NG/ML-MCNC: 1.04 NG/ML (ref 0–2.5)
SODIUM SERPL-SCNC: 139 MMOL/L (ref 135–145)
TRIGL SERPL-MCNC: 131 MG/DL

## 2025-03-05 PROCEDURE — 36415 COLL VENOUS BLD VENIPUNCTURE: CPT

## 2025-03-05 PROCEDURE — 80061 LIPID PANEL: CPT

## 2025-03-05 PROCEDURE — 83036 HEMOGLOBIN GLYCOSYLATED A1C: CPT

## 2025-03-05 PROCEDURE — 80053 COMPREHEN METABOLIC PANEL: CPT

## 2025-03-05 PROCEDURE — G0103 PSA SCREENING: HCPCS

## 2025-03-11 ENCOUNTER — OFFICE VISIT (OUTPATIENT)
Dept: FAMILY MEDICINE | Facility: CLINIC | Age: 50
End: 2025-03-11
Payer: COMMERCIAL

## 2025-03-11 VITALS
OXYGEN SATURATION: 97 % | SYSTOLIC BLOOD PRESSURE: 128 MMHG | RESPIRATION RATE: 16 BRPM | HEIGHT: 72 IN | BODY MASS INDEX: 32.66 KG/M2 | DIASTOLIC BLOOD PRESSURE: 85 MMHG | WEIGHT: 241.13 LBS | TEMPERATURE: 97.5 F | HEART RATE: 79 BPM

## 2025-03-11 DIAGNOSIS — M15.0 PRIMARY OSTEOARTHRITIS INVOLVING MULTIPLE JOINTS: ICD-10-CM

## 2025-03-11 DIAGNOSIS — Z00.00 ROUTINE GENERAL MEDICAL EXAMINATION AT A HEALTH CARE FACILITY: Primary | ICD-10-CM

## 2025-03-11 DIAGNOSIS — Z12.11 SCREEN FOR COLON CANCER: ICD-10-CM

## 2025-03-11 DIAGNOSIS — E55.9 VITAMIN D DEFICIENCY DISEASE: ICD-10-CM

## 2025-03-11 DIAGNOSIS — E78.5 HYPERLIPIDEMIA LDL GOAL <100: ICD-10-CM

## 2025-03-11 DIAGNOSIS — F43.9 STRESS: ICD-10-CM

## 2025-03-11 DIAGNOSIS — F10.90 HEAVY ALCOHOL CONSUMPTION: ICD-10-CM

## 2025-03-11 PROCEDURE — 3074F SYST BP LT 130 MM HG: CPT | Performed by: FAMILY MEDICINE

## 2025-03-11 PROCEDURE — 3079F DIAST BP 80-89 MM HG: CPT | Performed by: FAMILY MEDICINE

## 2025-03-11 PROCEDURE — 99396 PREV VISIT EST AGE 40-64: CPT | Performed by: FAMILY MEDICINE

## 2025-03-11 PROCEDURE — 99213 OFFICE O/P EST LOW 20 MIN: CPT | Mod: 25 | Performed by: FAMILY MEDICINE

## 2025-03-11 PROCEDURE — 1126F AMNT PAIN NOTED NONE PRSNT: CPT | Performed by: FAMILY MEDICINE

## 2025-03-11 RX ORDER — NALTREXONE HYDROCHLORIDE 50 MG/1
50 TABLET, FILM COATED ORAL DAILY
Qty: 30 TABLET | Refills: 5 | Status: SHIPPED | OUTPATIENT
Start: 2025-03-11

## 2025-03-11 RX ORDER — ATORVASTATIN CALCIUM 20 MG/1
20 TABLET, FILM COATED ORAL DAILY
Qty: 90 TABLET | Refills: 3 | Status: SHIPPED | OUTPATIENT
Start: 2025-03-11

## 2025-03-11 SDOH — HEALTH STABILITY: PHYSICAL HEALTH: ON AVERAGE, HOW MANY DAYS PER WEEK DO YOU ENGAGE IN MODERATE TO STRENUOUS EXERCISE (LIKE A BRISK WALK)?: 1 DAY

## 2025-03-11 SDOH — HEALTH STABILITY: PHYSICAL HEALTH: ON AVERAGE, HOW MANY MINUTES DO YOU ENGAGE IN EXERCISE AT THIS LEVEL?: 60 MIN

## 2025-03-11 ASSESSMENT — SOCIAL DETERMINANTS OF HEALTH (SDOH)
HOW OFTEN DO YOU GET TOGETHER WITH FRIENDS OR RELATIVES?: NEVER
HOW OFTEN DO YOU GET TOGETHER WITH FRIENDS OR RELATIVES?: NEVER

## 2025-03-11 ASSESSMENT — PAIN SCALES - GENERAL: PAINLEVEL_OUTOF10: NO PAIN (0)

## 2025-03-11 NOTE — PROGRESS NOTES
"Preventive Care Visit  [facility], [facility]  Mar 11, 2025      Assessment & Plan     (Z00.00) Routine general medical examination at a health care facility  (primary encounter diagnosis)  Comment: overall stable but discussed several issues   Plan: keep working on healthy diet/exercise and wt loss     (E78.5) Hyperlipidemia LDL goal <100  Comment: went over recent labs in great detail. We agreed to increase dose atorv to 20 mg daily then recheck labs in a couple months at fasting lab appointment   Plan: atorvastatin (LIPITOR) 20 MG tablet, Lipid         panel reflex to direct LDL Fasting, Hepatic         panel (Albumin, ALT, AST, Bili, Alk Phos, TP),         CK total             (F43.9) Stress  Comment: patient wants to see counselor/ therapist to discuss etoh etc.  He does not think he needs substance abuse treatment.   Plan: Adult Mental Health  Referral             (Z12.11) Screen for colon cancer  Comment: patient to schedule   Plan: Colonoscopy Screening  Referral             (E55.9) Vitamin D deficiency disease  Comment: patient taking fairly   high over the counter dose.    Plan: Vitamin D Deficiency        Check level when we recheck lipids in next few months.     (F10.90) Heavy alcohol consumption  Comment: patient interested in med to decrease craving.  Discussed in detail. Gave trial prescription for naltrexone.   Plan: Adult Mental Health  Referral,         naltrexone (DEPADE/REVIA) 50 MG tablet             (M15.0) Primary osteoarthritis involving multiple joints  Comment: advised glucosamine over the counter daily   Plan: as above.  If hand symptoms worsen, return to clinic.             BMI  Estimated body mass index is 33.02 kg/m  as calculated from the following:    Height as of this encounter: 1.82 m (5' 11.65\").    Weight as of this encounter: 109.4 kg (241 lb 2 oz).   Weight management plan: Discussed healthy diet and " exercise guidelines    Counseling  Appropriate preventive services were addressed with this patient via screening, questionnaire, or discussion as appropriate for fall prevention, nutrition, physical activity, Tobacco-use cessation, social engagement, weight loss and cognition.  Checklist reviewing preventive services available has been given to the patient.  Reviewed patient's diet, addressing concerns and/or questions.   He is at risk for lack of exercise and has been provided with information to increase physical activity for the benefit of his well-being.   Patient is at risk for social isolation and has been provided with information about the benefit of social connection.   The patient was instructed to see the dentist every 6 months.   The patient reports drinking more than 3 alcoholic drinks per day and/or more than 7 drhnks per week. The patient was counseled and given information about possible harmful effects of excessive alcohol intake.    No std concern    Dale Bush is a 50 year old, presenting for the following:  Physical (Fasting)        3/11/2025     6:57 AM   Additional Questions   Roomed by Ronel WALKER  Feeling well    Would like counseling for drinking    3 drinks currently daily etoh    Some aches and pains    Has been able to quit etoh for several days    Wt up     No change in diet    Eats decent     Kid healthy    Likely will be  soon    Living separate for 5 years     Commercial real estate    Mostly seated work    Not much exercise     Advance Care Planning  Patient does not have a Health Care Directive: Discussed advance care planning with patient; however, patient declined at this time.      3/11/2025   General Health   How would you rate your overall physical health? Good   Feel stress (tense, anxious, or unable to sleep) Only a little   (!) STRESS CONCERN      3/11/2025   Nutrition   Three or more servings of calcium each day? (!) NO   Diet: Regular  (no restrictions)   How many servings of fruit and vegetables per day? (!) 0-1   How many sweetened beverages each day? 0-1         3/11/2025   Exercise   Days per week of moderate/strenous exercise 1 day   Average minutes spent exercising at this level 60 min   (!) EXERCISE CONCERN      3/11/2025   Social Factors   Frequency of gathering with friends or relatives Never   Worry food won't last until get money to buy more No   Food not last or not have enough money for food? No   Do you have housing? (Housing is defined as stable permanent housing and does not include staying ouside in a car, in a tent, in an abandoned building, in an overnight shelter, or couch-surfing.) Yes   Are you worried about losing your housing? No   Lack of transportation? No   Unable to get utilities (heat,electricity)? No   (!) SOCIAL CONNECTIONS CONCERN      3/11/2025   Dental   Dentist two times every year? (!) NO           Today's PHQ-2 Score:       3/11/2025     6:38 AM   PHQ-2 ( 1999 [employer])   Q1: Little interest or pleasure in doing things 0   Q2: Feeling down, depressed or hopeless 1   PHQ-2 Score 1    Q1: Little interest or pleasure in doing things Not at all   Q2: Feeling down, depressed or hopeless Several days   PHQ-2 Score 1       Patient-reported           3/11/2025   Substance Use   Alcohol more than 3/day or more than 7/wk Yes   How often do you have a drink containing alcohol 4 or more times a week   How many alcohol drinks on typical day 3 or 4   How often do you have 5+ drinks at one occasion Weekly   Audit 2/3 Score 4   How often not able to stop drinking once started Never   How often failed to do what normally expected Never   How often needed first drink in am after a heavy drinking session Never   How often feeling of guilt or remorse after drinking Less than monthly   How often unable to remember what happened the night before Weekly   Have you or someone else been injured because of your drinking No   Has anyone  "been concerned or suggested you cut down on drinking No   TOTAL SCORE - AUDIT 12   Do you use any other substances recreationally? No     Social History     Tobacco Use    Smoking status: Former     Types: Cigarettes     Passive exposure: Never    Smokeless tobacco: Never   Vaping Use    Vaping status: Never Used   Substance Use Topics    Alcohol use: Yes     Comment: 3drinks per day 21 per week    Drug use: No             3/11/2025   One time HIV Screening   Previous HIV test? Yes         3/11/2025   STI Screening   New sexual partner(s) since last STI/HIV test? No   ASCVD Risk   The 10-year ASCVD risk score (Mamadou WU, et al., 2019) is: 3%    Values used to calculate the score:      Age: 50 years      Sex: Male      Is Non- : No      Diabetic: No      Tobacco smoker: No      Systolic Blood Pressure: 128 mmHg      Is BP treated: No      HDL Cholesterol: 59 mg/dL      Total Cholesterol: 218 mg/dL       Reviewed and updated as needed this visit by Provider                   No withdrawal when lay off etoh    At least 8 hours of sleep      Objective    Exam  /85 (BP Location: Left arm, Patient Position: Chair, Cuff Size: Adult Large)   Pulse 79   Temp 97.5  F (36.4  C) (Temporal)   Resp 16   Ht 1.82 m (5' 11.65\")   Wt 109.4 kg (241 lb 2 oz)   SpO2 97%   BMI 33.02 kg/m     Estimated body mass index is 33.02 kg/m  as calculated from the following:    Height as of this encounter: 1.82 m (5' 11.65\").    Weight as of this encounter: 109.4 kg (241 lb 2 oz).    Physical Exam  GENERAL: alert and no distress  EYES: Eyes grossly normal to inspection, PERRL and conjunctivae and sclerae normal  HENT: ear canals and TM's normal, nose and mouth without ulcers or lesions  NECK: no adenopathy, no asymmetry, masses, or scars  RESP: lungs clear to auscultation - no rales, rhonchi or wheezes  CV: regular rate and rhythm, normal S1 S2, no S3 or S4, no murmur, click or rub, no peripheral " edema  ABDOMEN: soft, nontender, no hepatosplenomegaly, no masses and bowel sounds normal  SKIN: no suspicious lesions or rashes  NEURO: Normal strength and tone, mentation intact and speech normal  PSYCH: mentation appears normal, affect normal/bright  Patient with mild OA changes of hands.  Slightly positive tinels test right hand, neg on left.  Neg phalens. Good strength right hand digits.        Signed Electronically by: Scot Raygoza MD

## 2025-03-11 NOTE — PATIENT INSTRUCTIONS
Advise starting glucosamine 1500 mg daily ( arthritis vitamin )    Increase atorvastatin to 20 mg ; in a couple months could do fasting lab appointment     Keep working on healthy diet/exercise and weight    Reduce alcohol intake    Call for counselor/ therapist    Start naltrexone one daily    Call/ return to clinic with problems/ questions                  Patient Education   Preventive Care Advice   This is general advice given by our system to help you stay healthy. However, your care team may have specific advice just for you. Please talk to your care team about your preventive care needs.  Nutrition  Eat 5 or more servings of fruits and vegetables each day.  Try wheat bread, brown rice and whole grain pasta (instead of white bread, rice, and pasta).  Get enough calcium and vitamin D. Check the label on foods and aim for 100% of the RDA (recommended daily allowance).  Lifestyle  Exercise at least 150 minutes each week  (30 minutes a day, 5 days a week).  Do muscle strengthening activities 2 days a week. These help control your weight and prevent disease.  No smoking.  Wear sunscreen to prevent skin cancer.  Have a dental exam and cleaning every 6 months.  Yearly exams  See your health care team every year to talk about:  Any changes in your health.  Any medicines your care team has prescribed.  Preventive care, family planning, and ways to prevent chronic diseases.  Shots (vaccines)   HPV shots (up to age 26), if you've never had them before.  Hepatitis B shots (up to age 59), if you've never had them before.  COVID-19 shot: Get this shot when it's due.  Flu shot: Get a flu shot every year.  Tetanus shot: Get a tetanus shot every 10 years.  Pneumococcal, hepatitis A, and RSV shots: Ask your care team if you need these based on your risk.  Shingles shot (for age 50 and up)  General health tests  Diabetes screening:  Starting at age 35, Get screened for diabetes at least every 3 years.  If you are younger  than age 35, ask your care team if you should be screened for diabetes.  Cholesterol test: At age 39, start having a cholesterol test every 5 years, or more often if advised.  Bone density scan (DEXA): At age 50, ask your care team if you should have this scan for osteoporosis (brittle bones).  Hepatitis C: Get tested at least once in your life.  STIs (sexually transmitted infections)  Before age 24: Ask your care team if you should be screened for STIs.  After age 24: Get screened for STIs if you're at risk. You are at risk for STIs (including HIV) if:  You are sexually active with more than one person.  You don't use condoms every time.  You or a partner was diagnosed with a sexually transmitted infection.  If you are at risk for HIV, ask about PrEP medicine to prevent HIV.  Get tested for HIV at least once in your life, whether you are at risk for HIV or not.  Cancer screening tests  Cervical cancer screening: If you have a cervix, begin getting regular cervical cancer screening tests starting at age 21.  Breast cancer scan (mammogram): If you've ever had breasts, begin having regular mammograms starting at age 40. This is a scan to check for breast cancer.  Colon cancer screening: It is important to start screening for colon cancer at age 45.  Have a colonoscopy test every 10 years (or more often if you're at risk) Or, ask your provider about stool tests like a FIT test every year or Cologuard test every 3 years.  To learn more about your testing options, visit:   .  For help making a decision, visit:   https://bit.ly/ke67516.  Prostate cancer screening test: If you have a prostate, ask your care team if a prostate cancer screening test (PSA) at age 55 is right for you.  Lung cancer screening: If you are a current or former smoker ages 50 to 80, ask your care team if ongoing lung cancer screenings are right for you.  For informational purposes only. Not to replace the advice of your health care provider.  Copyright   2023 [facility]. All rights reserved. Clinically reviewed by the [facility]. [employer] 449743 - REV 01/24.  Relationships for Good Health  Relationships are important for our health and happiness. Social isolation, loneliness and lack of support are bad for your health. Studies show that loneliness can harm health and limit your life span as much as high blood pressure and smoking.   Take some time to reflect on your relationships. Then answer these questions:  Are there people in your life that cause you stress or drain your energy? What can you do to set limits?  ________________________________________________________________________________________________________________________________________________________________________________________________________________________________________________________________________________________________________________________________________________  Who do you enjoy spending time with? Who can you go to for support?  ________________________________________________________________________________________________________________________________________________________________________________________________________________________________________________________________________________________________________________________________________________  What can you do to improve your relationships with others?  __________________________________________________________________________________________________________________________________________________________________________________________________________________  ______________________________________________________________________________________________________________________________  What do you like most about your relationships with  "others?  ________________________________________________________________________________________________________________________________________________________________________________________________________________________________________________________________________________________________________________________________________________  My goal: ______________________________________________________________________  I will: ______________________________________________________________________________________________________________________________________________________________________________________________    For informational purposes only. Not to replace the advice of your health care provider. Copyright   2018 [facility]. All rights reserved. Clinically reviewed by [facility]  Team. [employer] 856791 - Rev 06/24.  9 Ways to Cut Back on Drinking  Maybe you've found yourself drinking more alcohol than you'd prefer. If you want to cut back, here are some ideas to try.    Think before you drink.  Do you really want a drink, or is it just a habit? If you're used to having a drink at a certain time, try doing something else then.     Look for substitutes.  Find some no-alcohol drinks that you enjoy, like flavored seltzer water, tea with honey, or tonic with a slice of lime. Or try alcohol-free beer or \"virgin\" cocktails (without the alcohol).     Drink more water.  Use water to quench your thirst. Drink a glass of water before you have any alcohol. Have another glass along with every drink or between drinks.     Shrink your drink.  For example, have a bottle of beer instead of a pint. Use a smaller glass for wine. Choose drinks with lower alcohol content (ABV%). Or use less liquor and more mixer in cocktails.     Slow down.  It's easy to drink quickly and without thinking about it. Pay attention, and make each drink last longer.     Do the math.  Total up how much you spend on " "alcohol each month. How much is that a year? If you cut back, what could you do with the money you save?     Take a break.  Choose a day or two each week when you won't drink at all. Notice how you feel on those days, physically and emotionally. How did you sleep? Do you feel better? Over time, add more break days.     Count calories.  Would you like to lose some weight? For some people that's a good motivator for cutting back. Figure out how many calories are in each drink. How many does that add up to in a day? In a week? In a month?     Practice saying no.  Be ready when someone offers you a drink. Try: \"Thanks, I've had enough.\" Or \"Thanks, but I'm cutting back.\" Or \"No, thanks. I feel better when I drink less.\"   Current as of: November 15, 2023  Content Version: 14.3    2024 [employer].   Care instructions adapted under license by your healthcare professional. If you have questions about a medical condition or this instruction, always ask your healthcare professional. [employer] disclaims any warranty or liability for your use of this information.     "

## 2025-03-27 RX ORDER — PROCHLORPERAZINE MALEATE 10 MG
10 TABLET ORAL EVERY 6 HOURS PRN
Status: CANCELLED | OUTPATIENT
Start: 2025-03-27

## 2025-03-27 RX ORDER — NALOXONE HYDROCHLORIDE 0.4 MG/ML
0.4 INJECTION, SOLUTION INTRAMUSCULAR; INTRAVENOUS; SUBCUTANEOUS
Status: CANCELLED | OUTPATIENT
Start: 2025-03-27

## 2025-03-27 RX ORDER — NALOXONE HYDROCHLORIDE 0.4 MG/ML
0.2 INJECTION, SOLUTION INTRAMUSCULAR; INTRAVENOUS; SUBCUTANEOUS
Status: CANCELLED | OUTPATIENT
Start: 2025-03-27

## 2025-03-27 RX ORDER — ONDANSETRON 2 MG/ML
4 INJECTION INTRAMUSCULAR; INTRAVENOUS EVERY 6 HOURS PRN
Status: CANCELLED | OUTPATIENT
Start: 2025-03-27

## 2025-03-27 RX ORDER — ONDANSETRON 4 MG/1
4 TABLET, ORALLY DISINTEGRATING ORAL EVERY 6 HOURS PRN
Status: CANCELLED | OUTPATIENT
Start: 2025-03-27

## 2025-03-27 RX ORDER — FLUMAZENIL 0.1 MG/ML
0.2 INJECTION, SOLUTION INTRAVENOUS
Status: CANCELLED | OUTPATIENT
Start: 2025-03-27 | End: 2025-03-28

## 2025-03-28 ENCOUNTER — HOSPITAL ENCOUNTER (OUTPATIENT)
Facility: AMBULATORY SURGERY CENTER | Age: 50
Discharge: HOME | End: 2025-03-28
Attending: INTERNAL MEDICINE
Payer: COMMERCIAL

## 2025-03-28 VITALS
BODY MASS INDEX: 33.6 KG/M2 | DIASTOLIC BLOOD PRESSURE: 80 MMHG | OXYGEN SATURATION: 94 % | WEIGHT: 240 LBS | HEIGHT: 71 IN | TEMPERATURE: 97 F | RESPIRATION RATE: 16 BRPM | SYSTOLIC BLOOD PRESSURE: 115 MMHG | HEART RATE: 76 BPM

## 2025-03-28 LAB — COLONOSCOPY: NORMAL

## 2025-03-28 PROCEDURE — 45378 DIAGNOSTIC COLONOSCOPY: CPT | Mod: 33 | Performed by: INTERNAL MEDICINE

## 2025-03-28 RX ORDER — ONDANSETRON 2 MG/ML
4 INJECTION INTRAMUSCULAR; INTRAVENOUS
Status: DISCONTINUED | OUTPATIENT
Start: 2025-03-28 | End: 2025-03-29 | Stop reason: HOSPADM

## 2025-03-28 RX ORDER — LIDOCAINE 40 MG/G
CREAM TOPICAL
Status: DISCONTINUED | OUTPATIENT
Start: 2025-03-28 | End: 2025-03-29 | Stop reason: HOSPADM

## 2025-03-28 NOTE — H&P
Eleazar Herrera  [id number]  male  50 year old      Reason for procedure/surgery: screening    Patient Active Problem List   Diagnosis    Lumbago    Family history of Parkinson's disease    Hyperlipidemia LDL goal <130    Tremor    Lumbosacral disc herniation    Anxiety    Change in hearing    Snores    Sleep disorder    Anxiety state    Lipoma    Parkinson genetic tsting 2015       Past Surgical History:    Past Surgical History:   Procedure Laterality Date    DISCECTOMY LUMBAR POSTERIOR MICROSCOPIC ONE LEVEL  2013    Procedure: DISCECTOMY LUMBAR POSTERIOR MICROSCOPIC ONE LEVEL;  Left Lumbar 5-Sacral 1 Microdiscectomy ;  Surgeon: James Curran MD;  Location: [facility] OR    epidural injection      back problem prior to  surgery and past surgery    SINUS SURGERY      TONSILLECTOMY      VASECTOMY         Past Medical History:   Past Medical History:   Diagnosis Date    Change in hearing 2018    Family history of Parkinson's disease 2011    Lumbago 2011    Parkinson genetic tsting 2015    Next generation sequencing from 4/4/15    TEST REQUESTED:  Parkinson disease.  Next generation sequencing of:  [id number], AT, CHMP2B, COMT, DCTN1, DNAJC6, EIF4G1, FBXO7, GBA,  GIGYF2, GRN, HTRA2, LRRK2, MAPT, NR4A2, PARK2, PARK7, PINK1, PLA2G6,  SNCA, SNCAIP, SNCB, UCHL1, VPS35      Snores 2018    Tremor     Tremor 10/31/2012       Social History:   Social History     Tobacco Use    Smoking status: Former     Types: Cigarettes     Passive exposure: Never    Smokeless tobacco: Never   Substance Use Topics    Alcohol use: Yes     Comment: 3drinks per day 21 per week       Family History:   Family History   Problem Relation Age of Onset    Parkinsonism Mother          78    Restless Leg Syndrome Father     Lung Cancer Father     Bone Cancer Father     Cerebrovascular Disease Father     Other - See Comments Sister         maggi watkins    Other - See  "Comments Sister         maty watkins    Other - See Comments Sister salvador watkins    Diabetes Sister         type i    Other - See Comments Sister maggi watkins    Tremor Brother     Other - See Comments Brother         [place]    Other - See Comments Maternal Grandmother     Parkinsonism Maternal Grandfather     Other - See Comments Paternal Grandmother     Other - See Comments Paternal Grandfather     Other - See Comments Son         living with him    Other - See Comments Maternal Uncle     Tremor Maternal Uncle         aidan watkins    Other - See Comments Paternal Uncle     Breast Cancer Niece     Cancer Nephew     C.A.D. No family hx of        Allergies: No Known Allergies    Active Medications:   Current Outpatient Medications   Medication Sig Dispense Refill    atorvastatin (LIPITOR) 20 MG tablet Take 1 tablet (20 mg) by mouth daily. 90 tablet 3    naltrexone (DEPADE/REVIA) 50 MG tablet Take 1 tablet (50 mg) by mouth daily. 30 tablet 5    fluocinonide (LIDEX) 0.05 % external ointment Apply twice daily affected on lips for 1-2 weeks. 15 g 0       Systemic Review:   CONSTITUTIONAL: NEGATIVE for fever, chills, change in weight  ENT/MOUTH: NEGATIVE for ear, mouth and throat problems  RESP: NEGATIVE for significant cough or SOB  CV: NEGATIVE for chest pain, palpitations or peripheral edema    Physical Examination:   Vital Signs: /89 (BP Location: Right arm)   Pulse 89   Temp 97  F (36.1  C) (Temporal)   Resp 18   Ht 1.803 m (5' 11\")   Wt 108.9 kg (240 lb)   SpO2 96%   BMI 33.47 kg/m    GENERAL: healthy, alert and no distress  NECK: no adenopathy, no asymmetry, masses, or scars  RESP: lungs clear to auscultation - no rales, rhonchi or wheezes  CV: regular rate and rhythm, normal S1 S2, no S3 or S4, no murmur, click or rub, no peripheral edema and peripheral pulses strong  ABDOMEN: soft, nontender, no hepatosplenomegaly, no masses and bowel " sounds normal  MS: no gross musculoskeletal defects noted, no edema    Plan: Appropriate to proceed as scheduled.      Manish Stephens MD  3/28/2025    PCP:  Scot Raygoza

## 2025-06-11 ENCOUNTER — ANCILLARY PROCEDURE (OUTPATIENT)
Dept: MAMMOGRAPHY | Facility: CLINIC | Age: 50
End: 2025-06-11
Attending: FAMILY MEDICINE
Payer: COMMERCIAL

## 2025-06-11 DIAGNOSIS — N63.20 MASS OF LEFT BREAST, UNSPECIFIED QUADRANT: ICD-10-CM

## 2025-06-11 PROCEDURE — 77066 DX MAMMO INCL CAD BI: CPT | Performed by: STUDENT IN AN ORGANIZED HEALTH CARE EDUCATION/TRAINING PROGRAM

## 2025-06-11 PROCEDURE — 76642 ULTRASOUND BREAST LIMITED: CPT | Mod: LT | Performed by: STUDENT IN AN ORGANIZED HEALTH CARE EDUCATION/TRAINING PROGRAM

## 2025-06-18 ENCOUNTER — ANCILLARY PROCEDURE (OUTPATIENT)
Dept: MAMMOGRAPHY | Facility: CLINIC | Age: 50
End: 2025-06-18
Attending: FAMILY MEDICINE
Payer: COMMERCIAL

## 2025-06-18 DIAGNOSIS — N63.20 MASS OF LEFT BREAST, UNSPECIFIED QUADRANT: ICD-10-CM

## 2025-06-18 PROCEDURE — 88305 TISSUE EXAM BY PATHOLOGIST: CPT | Mod: TC | Performed by: FAMILY MEDICINE

## 2025-06-18 PROCEDURE — 19083 BX BREAST 1ST LESION US IMAG: CPT | Mod: LT

## 2025-06-18 PROCEDURE — 88304 TISSUE EXAM BY PATHOLOGIST: CPT | Mod: 26 | Performed by: PATHOLOGY

## 2025-06-23 ENCOUNTER — TELEPHONE (OUTPATIENT)
Dept: MAMMOGRAPHY | Facility: CLINIC | Age: 50
End: 2025-06-23
Payer: COMMERCIAL

## 2025-06-23 LAB
PATH REPORT.COMMENTS IMP SPEC: NORMAL
PATH REPORT.COMMENTS IMP SPEC: NORMAL
PATH REPORT.FINAL DX SPEC: NORMAL
PATH REPORT.GROSS SPEC: NORMAL
PATH REPORT.MICROSCOPIC SPEC OTHER STN: NORMAL
PATH REPORT.RELEVANT HX SPEC: NORMAL
PHOTO IMAGE: NORMAL

## 2025-06-23 NOTE — TELEPHONE ENCOUNTER
Spoke to Eleazar about the benign finding of a lipoma found in his left breast.  We discussed the Radiologist's recommendation of following up with his provider.  Eleazar verbalized understanding of the plan.

## 2025-06-24 PROBLEM — R06.83 SNORES: Status: RESOLVED | Noted: 2018-09-21 | Resolved: 2025-06-24

## 2025-06-24 PROBLEM — Z01.89 LABORATORY TEST: Status: RESOLVED | Noted: 2022-07-12 | Resolved: 2025-06-24
